# Patient Record
Sex: MALE | Race: WHITE | NOT HISPANIC OR LATINO | Employment: FULL TIME | URBAN - METROPOLITAN AREA
[De-identification: names, ages, dates, MRNs, and addresses within clinical notes are randomized per-mention and may not be internally consistent; named-entity substitution may affect disease eponyms.]

---

## 2020-02-11 ENCOUNTER — RECORDS - HEALTHEAST (OUTPATIENT)
Dept: LAB | Facility: CLINIC | Age: 65
End: 2020-02-11

## 2020-02-11 LAB
ALBUMIN SERPL-MCNC: 4 G/DL (ref 3.5–5)
ALP SERPL-CCNC: 76 U/L (ref 45–120)
ALT SERPL W P-5'-P-CCNC: 28 U/L (ref 0–45)
ANION GAP SERPL CALCULATED.3IONS-SCNC: 10 MMOL/L (ref 5–18)
AST SERPL W P-5'-P-CCNC: 34 U/L (ref 0–40)
BILIRUB SERPL-MCNC: 1.2 MG/DL (ref 0–1)
BUN SERPL-MCNC: 17 MG/DL (ref 8–22)
CALCIUM SERPL-MCNC: 8.9 MG/DL (ref 8.5–10.5)
CHLORIDE BLD-SCNC: 100 MMOL/L (ref 98–107)
CHOLEST SERPL-MCNC: 160 MG/DL
CO2 SERPL-SCNC: 27 MMOL/L (ref 22–31)
CREAT SERPL-MCNC: 1.04 MG/DL (ref 0.7–1.3)
FASTING STATUS PATIENT QL REPORTED: ABNORMAL
GFR SERPL CREATININE-BSD FRML MDRD: >60 ML/MIN/1.73M2
GLUCOSE BLD-MCNC: 105 MG/DL (ref 70–125)
HDLC SERPL-MCNC: 39 MG/DL
LDLC SERPL CALC-MCNC: 102 MG/DL
POTASSIUM BLD-SCNC: 4 MMOL/L (ref 3.5–5)
PROT SERPL-MCNC: 7 G/DL (ref 6–8)
PSA SERPL-MCNC: 1.4 NG/ML (ref 0–4.5)
SODIUM SERPL-SCNC: 137 MMOL/L (ref 136–145)
TRIGL SERPL-MCNC: 94 MG/DL

## 2020-03-18 ENCOUNTER — RECORDS - HEALTHEAST (OUTPATIENT)
Dept: LAB | Facility: CLINIC | Age: 65
End: 2020-03-18

## 2020-03-18 LAB
ALBUMIN SERPL-MCNC: 4 G/DL (ref 3.5–5)
ANION GAP SERPL CALCULATED.3IONS-SCNC: 13 MMOL/L (ref 5–18)
BUN SERPL-MCNC: 25 MG/DL (ref 8–22)
CALCIUM SERPL-MCNC: 9.5 MG/DL (ref 8.5–10.5)
CHLORIDE BLD-SCNC: 104 MMOL/L (ref 98–107)
CO2 SERPL-SCNC: 24 MMOL/L (ref 22–31)
CREAT SERPL-MCNC: 0.91 MG/DL (ref 0.7–1.3)
GFR SERPL CREATININE-BSD FRML MDRD: >60 ML/MIN/1.73M2
GLUCOSE BLD-MCNC: 66 MG/DL (ref 70–125)
PHOSPHATE SERPL-MCNC: 3.5 MG/DL (ref 2.5–4.5)
POTASSIUM BLD-SCNC: 5.2 MMOL/L (ref 3.5–5)
SODIUM SERPL-SCNC: 141 MMOL/L (ref 136–145)

## 2020-03-20 ASSESSMENT — MIFFLIN-ST. JEOR: SCORE: 1700.51

## 2020-03-23 ENCOUNTER — ANESTHESIA - HEALTHEAST (OUTPATIENT)
Dept: SURGERY | Facility: HOSPITAL | Age: 65
End: 2020-03-23

## 2020-03-24 ENCOUNTER — SURGERY - HEALTHEAST (OUTPATIENT)
Dept: SURGERY | Facility: HOSPITAL | Age: 65
End: 2020-03-24

## 2020-05-26 ENCOUNTER — RECORDS - HEALTHEAST (OUTPATIENT)
Dept: LAB | Facility: CLINIC | Age: 65
End: 2020-05-26

## 2020-05-26 LAB — POTASSIUM BLD-SCNC: 4.2 MMOL/L (ref 3.5–5)

## 2021-06-04 VITALS — WEIGHT: 207 LBS | HEIGHT: 68 IN | BODY MASS INDEX: 31.37 KG/M2

## 2021-06-07 NOTE — ANESTHESIA POSTPROCEDURE EVALUATION
Patient: Moo Orourke  Procedure(s):  CIRCUMCISION  Anesthesia type: general    Patient location: Phase II Recovery  Last vitals:   Vitals Value Taken Time   /76 3/24/2020  3:00 PM   Temp 36.6  C (97.8  F) 3/24/2020  2:14 PM   Pulse 96 3/24/2020  3:00 PM   Resp 16 3/24/2020  3:00 PM   SpO2 98 % 3/24/2020  3:00 PM     Post vital signs: stable  Level of consciousness: awake and responds to simple questions  Post-anesthesia pain: pain controlled  Post-anesthesia nausea and vomiting: no  Pulmonary: unassisted, return to baseline  Cardiovascular: stable and blood pressure at baseline  Hydration: adequate  Anesthetic events: no    QCDR Measures:  ASA# 11 - Anita-op Cardiac Arrest: ASA11B - Patient did NOT experience unanticipated cardiac arrest  ASA# 12 - Anita-op Mortality Rate: ASA12B - Patient did NOT die  ASA# 13 - PACU Re-Intubation Rate: ASA13B - Patient did NOT require a new airway mgmt  ASA# 10 - Composite Anes Safety: ASA10A - No serious adverse event    Additional Notes:

## 2021-06-07 NOTE — ANESTHESIA CARE TRANSFER NOTE
Last vitals:   Vitals:    03/24/20 1414   BP: 163/76   Pulse: (!) 103   Resp: 18   Temp: 36.6  C (97.8  F)   SpO2: 100%     Patient's level of consciousness is drowsy  Spontaneous respirations: yes  Maintains airway independently: yes  Dentition unchanged: yes  Oropharynx: oropharynx clear of all foreign objects    QCDR Measures:  ASA# 20 - Surgical Safety Checklist: WHO surgical safety checklist completed prior to induction    PQRS# 430 - Adult PONV Prevention: 4558F - Pt received => 2 anti-emetic agents (different classes) preop & intraop  ASA# 8 - Peds PONV Prevention: NA - Not pediatric patient, not GA or 2 or more risk factors NOT present  PQRS# 424 - Anita-op Temp Management: 4559F-8P - Body temp not recorded within timeframe  PQRS# 426 - PACU Transfer Protocol: - Transfer of care checklist used  ASA# 14 - Acute Post-op Pain: ASA14B - Patient did NOT experience pain >= 7 out of 10

## 2021-07-07 NOTE — ANESTHESIA PREPROCEDURE EVALUATION
Anesthesia Evaluation      Patient summary reviewed   No history of anesthetic complications     Airway   Mallampati: II  Neck ROM: full   Pulmonary - negative ROS and normal exam     ROS comment: Chronic cough since 11/2019                           Cardiovascular - normal exam  (+) hypertension well controlled, ,      Neuro/Psych - negative ROS     Endo/Other       Comments: Psoriasis      GI/Hepatic/Renal - negative ROS      Other findings: Basal cell cancer s/p mohs on scalp        Dental - normal exam   (+) caps                       Anesthesia Plan  Planned anesthetic: general endotracheal  Versed/fentanyl/propofol/sux  Ketamine PRN  Decadron/zofran    ASA 2   Induction: intravenous   Anesthetic plan and risks discussed with: patient  Anesthesia plan special considerations: antiemetics,   Post-op plan: routine recovery      Labs reviewed.  Imaging reviewed.     Routine observation Routine observation

## 2022-02-16 ENCOUNTER — LAB REQUISITION (OUTPATIENT)
Dept: LAB | Facility: CLINIC | Age: 67
End: 2022-02-16
Payer: COMMERCIAL

## 2022-02-16 DIAGNOSIS — I10 ESSENTIAL (PRIMARY) HYPERTENSION: ICD-10-CM

## 2022-02-16 DIAGNOSIS — Z12.5 ENCOUNTER FOR SCREENING FOR MALIGNANT NEOPLASM OF PROSTATE: ICD-10-CM

## 2022-02-16 DIAGNOSIS — Z13.220 ENCOUNTER FOR SCREENING FOR LIPOID DISORDERS: ICD-10-CM

## 2022-02-16 PROCEDURE — G0103 PSA SCREENING: HCPCS | Mod: ORL | Performed by: FAMILY MEDICINE

## 2022-02-16 PROCEDURE — 80061 LIPID PANEL: CPT | Mod: ORL | Performed by: FAMILY MEDICINE

## 2022-02-16 PROCEDURE — 80053 COMPREHEN METABOLIC PANEL: CPT | Mod: ORL | Performed by: FAMILY MEDICINE

## 2022-02-17 LAB
ALBUMIN SERPL-MCNC: 4.3 G/DL (ref 3.5–5)
ALP SERPL-CCNC: 72 U/L (ref 45–120)
ALT SERPL W P-5'-P-CCNC: 43 U/L (ref 0–45)
ANION GAP SERPL CALCULATED.3IONS-SCNC: 11 MMOL/L (ref 5–18)
AST SERPL W P-5'-P-CCNC: 36 U/L (ref 0–40)
BILIRUB SERPL-MCNC: 1.1 MG/DL (ref 0–1)
BUN SERPL-MCNC: 27 MG/DL (ref 8–22)
CALCIUM SERPL-MCNC: 9.9 MG/DL (ref 8.5–10.5)
CHLORIDE BLD-SCNC: 104 MMOL/L (ref 98–107)
CHOLEST SERPL-MCNC: 211 MG/DL
CO2 SERPL-SCNC: 25 MMOL/L (ref 22–31)
CREAT SERPL-MCNC: 0.94 MG/DL (ref 0.7–1.3)
GFR SERPL CREATININE-BSD FRML MDRD: >90 ML/MIN/1.73M2
GLUCOSE BLD-MCNC: 93 MG/DL (ref 70–125)
HDLC SERPL-MCNC: 41 MG/DL
LDLC SERPL CALC-MCNC: 125 MG/DL
POTASSIUM BLD-SCNC: 4 MMOL/L (ref 3.5–5)
PROT SERPL-MCNC: 7.7 G/DL (ref 6–8)
PSA SERPL-MCNC: 1.58 UG/L (ref 0–4.5)
SODIUM SERPL-SCNC: 140 MMOL/L (ref 136–145)
TRIGL SERPL-MCNC: 223 MG/DL

## 2023-02-17 ENCOUNTER — LAB REQUISITION (OUTPATIENT)
Dept: LAB | Facility: CLINIC | Age: 68
End: 2023-02-17
Payer: COMMERCIAL

## 2023-02-17 DIAGNOSIS — Z12.5 ENCOUNTER FOR SCREENING FOR MALIGNANT NEOPLASM OF PROSTATE: ICD-10-CM

## 2023-02-17 DIAGNOSIS — I10 ESSENTIAL (PRIMARY) HYPERTENSION: ICD-10-CM

## 2023-02-17 DIAGNOSIS — Z13.220 ENCOUNTER FOR SCREENING FOR LIPOID DISORDERS: ICD-10-CM

## 2023-02-17 LAB
ALBUMIN SERPL BCG-MCNC: 4.5 G/DL (ref 3.5–5.2)
ALP SERPL-CCNC: 75 U/L (ref 40–129)
ALT SERPL W P-5'-P-CCNC: 35 U/L (ref 10–50)
ANION GAP SERPL CALCULATED.3IONS-SCNC: 12 MMOL/L (ref 7–15)
AST SERPL W P-5'-P-CCNC: 39 U/L (ref 10–50)
BILIRUB SERPL-MCNC: 0.8 MG/DL
BUN SERPL-MCNC: 30.9 MG/DL (ref 8–23)
CALCIUM SERPL-MCNC: 9.1 MG/DL (ref 8.8–10.2)
CHLORIDE SERPL-SCNC: 104 MMOL/L (ref 98–107)
CHOLEST SERPL-MCNC: 215 MG/DL
CREAT SERPL-MCNC: 1.24 MG/DL (ref 0.67–1.17)
DEPRECATED HCO3 PLAS-SCNC: 24 MMOL/L (ref 22–29)
GFR SERPL CREATININE-BSD FRML MDRD: 65 ML/MIN/1.73M2
GLUCOSE SERPL-MCNC: 105 MG/DL (ref 70–99)
HDLC SERPL-MCNC: 40 MG/DL
LDLC SERPL CALC-MCNC: 134 MG/DL
NONHDLC SERPL-MCNC: 175 MG/DL
POTASSIUM SERPL-SCNC: 4.7 MMOL/L (ref 3.4–5.3)
PROT SERPL-MCNC: 6.8 G/DL (ref 6.4–8.3)
PSA SERPL-MCNC: 1.67 NG/ML (ref 0–4.5)
SODIUM SERPL-SCNC: 140 MMOL/L (ref 136–145)
TRIGL SERPL-MCNC: 206 MG/DL

## 2023-02-17 PROCEDURE — 80061 LIPID PANEL: CPT | Mod: ORL | Performed by: FAMILY MEDICINE

## 2023-02-17 PROCEDURE — G0103 PSA SCREENING: HCPCS | Mod: ORL | Performed by: FAMILY MEDICINE

## 2023-02-17 PROCEDURE — 80053 COMPREHEN METABOLIC PANEL: CPT | Mod: ORL | Performed by: FAMILY MEDICINE

## 2023-12-29 ENCOUNTER — LAB REQUISITION (OUTPATIENT)
Dept: LAB | Facility: CLINIC | Age: 68
End: 2023-12-29
Payer: COMMERCIAL

## 2023-12-29 DIAGNOSIS — I10 ESSENTIAL (PRIMARY) HYPERTENSION: ICD-10-CM

## 2023-12-29 DIAGNOSIS — H35.62 RETINAL HEMORRHAGE, LEFT EYE: ICD-10-CM

## 2023-12-29 DIAGNOSIS — Z12.5 ENCOUNTER FOR SCREENING FOR MALIGNANT NEOPLASM OF PROSTATE: ICD-10-CM

## 2023-12-29 DIAGNOSIS — Z13.220 ENCOUNTER FOR SCREENING FOR LIPOID DISORDERS: ICD-10-CM

## 2023-12-29 LAB
ALBUMIN SERPL BCG-MCNC: 4.5 G/DL (ref 3.5–5.2)
ALP SERPL-CCNC: 83 U/L (ref 40–150)
ALT SERPL W P-5'-P-CCNC: 40 U/L (ref 0–70)
ANION GAP SERPL CALCULATED.3IONS-SCNC: 15 MMOL/L (ref 7–15)
AST SERPL W P-5'-P-CCNC: 47 U/L (ref 0–45)
BILIRUB SERPL-MCNC: 1.3 MG/DL
BUN SERPL-MCNC: 19.4 MG/DL (ref 8–23)
CALCIUM SERPL-MCNC: 9.2 MG/DL (ref 8.8–10.2)
CHLORIDE SERPL-SCNC: 100 MMOL/L (ref 98–107)
CHOLEST SERPL-MCNC: 222 MG/DL
CREAT SERPL-MCNC: 1 MG/DL (ref 0.67–1.17)
CRP SERPL-MCNC: <3 MG/L
DEPRECATED HCO3 PLAS-SCNC: 23 MMOL/L (ref 22–29)
EGFRCR SERPLBLD CKD-EPI 2021: 83 ML/MIN/1.73M2
ERYTHROCYTE [DISTWIDTH] IN BLOOD BY AUTOMATED COUNT: 12.9 % (ref 10–15)
ERYTHROCYTE [SEDIMENTATION RATE] IN BLOOD BY WESTERGREN METHOD: 11 MM/HR (ref 0–20)
FASTING STATUS PATIENT QL REPORTED: ABNORMAL
GLUCOSE SERPL-MCNC: 94 MG/DL (ref 70–99)
HBA1C MFR BLD: 6.1 %
HCT VFR BLD AUTO: 45.7 % (ref 40–53)
HDLC SERPL-MCNC: 38 MG/DL
HGB BLD-MCNC: 15.6 G/DL (ref 13.3–17.7)
LDLC SERPL CALC-MCNC: 154 MG/DL
MCH RBC QN AUTO: 30.2 PG (ref 26.5–33)
MCHC RBC AUTO-ENTMCNC: 34.1 G/DL (ref 31.5–36.5)
MCV RBC AUTO: 89 FL (ref 78–100)
NONHDLC SERPL-MCNC: 184 MG/DL
PLATELET # BLD AUTO: 200 10E3/UL (ref 150–450)
POTASSIUM SERPL-SCNC: 4.1 MMOL/L (ref 3.4–5.3)
PROT SERPL-MCNC: 7.7 G/DL (ref 6.4–8.3)
PSA SERPL DL<=0.01 NG/ML-MCNC: 2.28 NG/ML (ref 0–4.5)
RBC # BLD AUTO: 5.16 10E6/UL (ref 4.4–5.9)
SODIUM SERPL-SCNC: 138 MMOL/L (ref 135–145)
TRIGL SERPL-MCNC: 152 MG/DL
WBC # BLD AUTO: 6.9 10E3/UL (ref 4–11)

## 2023-12-29 PROCEDURE — 83036 HEMOGLOBIN GLYCOSYLATED A1C: CPT | Mod: ORL | Performed by: FAMILY MEDICINE

## 2023-12-29 PROCEDURE — 80053 COMPREHEN METABOLIC PANEL: CPT | Mod: ORL | Performed by: FAMILY MEDICINE

## 2023-12-29 PROCEDURE — 80061 LIPID PANEL: CPT | Mod: ORL | Performed by: FAMILY MEDICINE

## 2023-12-29 PROCEDURE — 86140 C-REACTIVE PROTEIN: CPT | Mod: ORL | Performed by: FAMILY MEDICINE

## 2023-12-29 PROCEDURE — 85027 COMPLETE CBC AUTOMATED: CPT | Mod: ORL | Performed by: FAMILY MEDICINE

## 2023-12-29 PROCEDURE — 85652 RBC SED RATE AUTOMATED: CPT | Mod: ORL | Performed by: FAMILY MEDICINE

## 2023-12-29 PROCEDURE — G0103 PSA SCREENING: HCPCS | Mod: ORL | Performed by: FAMILY MEDICINE

## 2024-07-10 ENCOUNTER — TRANSFERRED RECORDS (OUTPATIENT)
Dept: HEALTH INFORMATION MANAGEMENT | Facility: CLINIC | Age: 69
End: 2024-07-10

## 2024-07-10 ENCOUNTER — LAB REQUISITION (OUTPATIENT)
Dept: LAB | Facility: CLINIC | Age: 69
End: 2024-07-10
Payer: COMMERCIAL

## 2024-07-10 DIAGNOSIS — I10 ESSENTIAL (PRIMARY) HYPERTENSION: ICD-10-CM

## 2024-07-10 PROCEDURE — 80048 BASIC METABOLIC PNL TOTAL CA: CPT | Mod: ORL | Performed by: PHYSICIAN ASSISTANT

## 2024-07-11 LAB
ANION GAP SERPL CALCULATED.3IONS-SCNC: 14 MMOL/L (ref 7–15)
BUN SERPL-MCNC: 26.6 MG/DL (ref 8–23)
CALCIUM SERPL-MCNC: 9.1 MG/DL (ref 8.8–10.2)
CHLORIDE SERPL-SCNC: 102 MMOL/L (ref 98–107)
CREAT SERPL-MCNC: 1.13 MG/DL (ref 0.67–1.17)
DEPRECATED HCO3 PLAS-SCNC: 23 MMOL/L (ref 22–29)
EGFRCR SERPLBLD CKD-EPI 2021: 71 ML/MIN/1.73M2
GLUCOSE SERPL-MCNC: 117 MG/DL (ref 70–99)
POTASSIUM SERPL-SCNC: 4.3 MMOL/L (ref 3.4–5.3)
SODIUM SERPL-SCNC: 139 MMOL/L (ref 135–145)

## 2024-08-13 ENCOUNTER — HOSPITAL ENCOUNTER (INPATIENT)
Facility: HOSPITAL | Age: 69
LOS: 2 days | Discharge: HOME | DRG: 321 | End: 2024-08-15
Attending: STUDENT IN AN ORGANIZED HEALTH CARE EDUCATION/TRAINING PROGRAM | Admitting: INTERNAL MEDICINE
Payer: COMMERCIAL

## 2024-08-13 ENCOUNTER — HOSPITAL ENCOUNTER (EMERGENCY)
Facility: CLINIC | Age: 69
Discharge: OTHER INSTITUTION | End: 2024-08-13
Attending: STUDENT IN AN ORGANIZED HEALTH CARE EDUCATION/TRAINING PROGRAM | Admitting: STUDENT IN AN ORGANIZED HEALTH CARE EDUCATION/TRAINING PROGRAM
Payer: COMMERCIAL

## 2024-08-13 ENCOUNTER — APPOINTMENT (OUTPATIENT)
Dept: CARDIOLOGY | Facility: HOSPITAL | Age: 69
DRG: 321 | End: 2024-08-13
Attending: INTERNAL MEDICINE
Payer: COMMERCIAL

## 2024-08-13 ENCOUNTER — APPOINTMENT (OUTPATIENT)
Dept: GENERAL RADIOLOGY | Facility: CLINIC | Age: 69
End: 2024-08-13
Attending: STUDENT IN AN ORGANIZED HEALTH CARE EDUCATION/TRAINING PROGRAM
Payer: COMMERCIAL

## 2024-08-13 VITALS
TEMPERATURE: 97.7 F | DIASTOLIC BLOOD PRESSURE: 106 MMHG | RESPIRATION RATE: 25 BRPM | WEIGHT: 216 LBS | HEART RATE: 105 BPM | OXYGEN SATURATION: 94 % | BODY MASS INDEX: 33.33 KG/M2 | SYSTOLIC BLOOD PRESSURE: 172 MMHG

## 2024-08-13 DIAGNOSIS — I10 PRIMARY HYPERTENSION: ICD-10-CM

## 2024-08-13 DIAGNOSIS — E78.5 HYPERLIPIDEMIA LDL GOAL <100: ICD-10-CM

## 2024-08-13 DIAGNOSIS — I25.10 CAD, MULTIPLE VESSEL: ICD-10-CM

## 2024-08-13 DIAGNOSIS — I21.3 ST ELEVATION MI (STEMI) (H): ICD-10-CM

## 2024-08-13 DIAGNOSIS — I21.11 ST ELEVATION MYOCARDIAL INFARCTION INVOLVING RIGHT CORONARY ARTERY (H): Primary | ICD-10-CM

## 2024-08-13 DIAGNOSIS — I25.2 HISTORY OF ST ELEVATION MYOCARDIAL INFARCTION (STEMI): ICD-10-CM

## 2024-08-13 DIAGNOSIS — Z95.5 STATUS POST INSERTION OF DRUG-ELUTING STENT INTO LEFT ANTERIOR DESCENDING (LAD) ARTERY: ICD-10-CM

## 2024-08-13 DIAGNOSIS — I21.3 ST ELEVATION MYOCARDIAL INFARCTION (STEMI), UNSPECIFIED ARTERY (H): ICD-10-CM

## 2024-08-13 LAB
ACT BLD: 270 SECONDS (ref 74–150)
ACT BLD: 354 SECONDS (ref 74–150)
ACT BLD: 408 SECONDS (ref 74–150)
ANION GAP SERPL CALCULATED.3IONS-SCNC: 14 MMOL/L (ref 7–15)
APTT PPP: 30 SECONDS (ref 22–38)
ATRIAL RATE - MUSE: 77 BPM
BASOPHILS # BLD AUTO: 0 10E3/UL (ref 0–0.2)
BASOPHILS NFR BLD AUTO: 0 %
BUN SERPL-MCNC: 30.5 MG/DL (ref 8–23)
CALCIUM SERPL-MCNC: 9.3 MG/DL (ref 8.8–10.4)
CHLORIDE SERPL-SCNC: 100 MMOL/L (ref 98–107)
CHOLEST SERPL-MCNC: 171 MG/DL
CREAT SERPL-MCNC: 1.02 MG/DL (ref 0.67–1.17)
DIASTOLIC BLOOD PRESSURE - MUSE: NORMAL MMHG
EGFRCR SERPLBLD CKD-EPI 2021: 81 ML/MIN/1.73M2
EOSINOPHIL # BLD AUTO: 0.1 10E3/UL (ref 0–0.7)
EOSINOPHIL NFR BLD AUTO: 1 %
ERYTHROCYTE [DISTWIDTH] IN BLOOD BY AUTOMATED COUNT: 12.9 % (ref 10–15)
GLUCOSE SERPL-MCNC: 145 MG/DL (ref 70–99)
HCO3 SERPL-SCNC: 25 MMOL/L (ref 22–29)
HCT VFR BLD AUTO: 46.3 % (ref 40–53)
HDLC SERPL-MCNC: 37 MG/DL
HGB BLD-MCNC: 15.9 G/DL (ref 13.3–17.7)
HOLD SPECIMEN: NORMAL
IMM GRANULOCYTES # BLD: 0 10E3/UL
IMM GRANULOCYTES NFR BLD: 0 %
INR PPP: 1.11 (ref 0.85–1.15)
INTERPRETATION ECG - MUSE: NORMAL
LDLC SERPL CALC-MCNC: 124 MG/DL
LVEF ECHO: NORMAL
LYMPHOCYTES # BLD AUTO: 2 10E3/UL (ref 0.8–5.3)
LYMPHOCYTES NFR BLD AUTO: 26 %
MCH RBC QN AUTO: 30.6 PG (ref 26.5–33)
MCHC RBC AUTO-ENTMCNC: 34.3 G/DL (ref 31.5–36.5)
MCV RBC AUTO: 89 FL (ref 78–100)
MONOCYTES # BLD AUTO: 0.5 10E3/UL (ref 0–1.3)
MONOCYTES NFR BLD AUTO: 7 %
NEUTROPHILS # BLD AUTO: 4.9 10E3/UL (ref 1.6–8.3)
NEUTROPHILS NFR BLD AUTO: 65 %
NONHDLC SERPL-MCNC: 134 MG/DL
NRBC # BLD AUTO: 0 10E3/UL
NRBC BLD AUTO-RTO: 0 /100
P AXIS - MUSE: 58 DEGREES
PLATELET # BLD AUTO: 209 10E3/UL (ref 150–450)
POTASSIUM SERPL-SCNC: 3.7 MMOL/L (ref 3.4–5.3)
PR INTERVAL - MUSE: 152 MS
QRS DURATION - MUSE: 78 MS
QT - MUSE: 368 MS
QTC - MUSE: 416 MS
R AXIS - MUSE: -16 DEGREES
RBC # BLD AUTO: 5.2 10E6/UL (ref 4.4–5.9)
SODIUM SERPL-SCNC: 139 MMOL/L (ref 135–145)
SYSTOLIC BLOOD PRESSURE - MUSE: NORMAL MMHG
T AXIS - MUSE: 8 DEGREES
TRIGL SERPL-MCNC: 49 MG/DL
TROPONIN T SERPL HS-MCNC: 128 NG/L
VENTRICULAR RATE- MUSE: 77 BPM
WBC # BLD AUTO: 7.6 10E3/UL (ref 4–11)

## 2024-08-13 PROCEDURE — 258N000003 HC RX IP 258 OP 636: Performed by: INTERNAL MEDICINE

## 2024-08-13 PROCEDURE — 250N000009 HC RX 250: Performed by: INTERNAL MEDICINE

## 2024-08-13 PROCEDURE — C1887 CATHETER, GUIDING: HCPCS | Performed by: INTERNAL MEDICINE

## 2024-08-13 PROCEDURE — 250N000013 HC RX MED GY IP 250 OP 250 PS 637: Performed by: STUDENT IN AN ORGANIZED HEALTH CARE EDUCATION/TRAINING PROGRAM

## 2024-08-13 PROCEDURE — 250N000011 HC RX IP 250 OP 636: Performed by: STUDENT IN AN ORGANIZED HEALTH CARE EDUCATION/TRAINING PROGRAM

## 2024-08-13 PROCEDURE — B240ZZ3 ULTRASONOGRAPHY OF SINGLE CORONARY ARTERY, INTRAVASCULAR: ICD-10-PCS | Performed by: INTERNAL MEDICINE

## 2024-08-13 PROCEDURE — 93010 ELECTROCARDIOGRAM REPORT: CPT | Performed by: STUDENT IN AN ORGANIZED HEALTH CARE EDUCATION/TRAINING PROGRAM

## 2024-08-13 PROCEDURE — 999N000208 ECHOCARDIOGRAM COMPLETE

## 2024-08-13 PROCEDURE — 99223 1ST HOSP IP/OBS HIGH 75: CPT | Performed by: STUDENT IN AN ORGANIZED HEALTH CARE EDUCATION/TRAINING PROGRAM

## 2024-08-13 PROCEDURE — 250N000013 HC RX MED GY IP 250 OP 250 PS 637: Performed by: INTERNAL MEDICINE

## 2024-08-13 PROCEDURE — 85347 COAGULATION TIME ACTIVATED: CPT

## 2024-08-13 PROCEDURE — 99153 MOD SED SAME PHYS/QHP EA: CPT | Performed by: INTERNAL MEDICINE

## 2024-08-13 PROCEDURE — 93306 TTE W/DOPPLER COMPLETE: CPT | Mod: 26 | Performed by: INTERNAL MEDICINE

## 2024-08-13 PROCEDURE — 255N000002 HC RX 255 OP 636: Performed by: INTERNAL MEDICINE

## 2024-08-13 PROCEDURE — 272N000001 HC OR GENERAL SUPPLY STERILE: Performed by: INTERNAL MEDICINE

## 2024-08-13 PROCEDURE — C9606 PERC D-E COR REVASC W AMI S: HCPCS | Mod: LD | Performed by: INTERNAL MEDICINE

## 2024-08-13 PROCEDURE — 027034Z DILATION OF CORONARY ARTERY, ONE ARTERY WITH DRUG-ELUTING INTRALUMINAL DEVICE, PERCUTANEOUS APPROACH: ICD-10-PCS | Performed by: INTERNAL MEDICINE

## 2024-08-13 PROCEDURE — 36415 COLL VENOUS BLD VENIPUNCTURE: CPT | Performed by: STUDENT IN AN ORGANIZED HEALTH CARE EDUCATION/TRAINING PROGRAM

## 2024-08-13 PROCEDURE — C1725 CATH, TRANSLUMIN NON-LASER: HCPCS | Performed by: INTERNAL MEDICINE

## 2024-08-13 PROCEDURE — 99152 MOD SED SAME PHYS/QHP 5/>YRS: CPT | Performed by: INTERNAL MEDICINE

## 2024-08-13 PROCEDURE — C1769 GUIDE WIRE: HCPCS | Performed by: INTERNAL MEDICINE

## 2024-08-13 PROCEDURE — 36415 COLL VENOUS BLD VENIPUNCTURE: CPT | Performed by: INTERNAL MEDICINE

## 2024-08-13 PROCEDURE — 93005 ELECTROCARDIOGRAM TRACING: CPT | Performed by: INTERNAL MEDICINE

## 2024-08-13 PROCEDURE — 85730 THROMBOPLASTIN TIME PARTIAL: CPT | Performed by: STUDENT IN AN ORGANIZED HEALTH CARE EDUCATION/TRAINING PROGRAM

## 2024-08-13 PROCEDURE — 93005 ELECTROCARDIOGRAM TRACING: CPT | Performed by: STUDENT IN AN ORGANIZED HEALTH CARE EDUCATION/TRAINING PROGRAM

## 2024-08-13 PROCEDURE — 99223 1ST HOSP IP/OBS HIGH 75: CPT | Performed by: INTERNAL MEDICINE

## 2024-08-13 PROCEDURE — 83718 ASSAY OF LIPOPROTEIN: CPT | Performed by: INTERNAL MEDICINE

## 2024-08-13 PROCEDURE — 85048 AUTOMATED LEUKOCYTE COUNT: CPT | Performed by: STUDENT IN AN ORGANIZED HEALTH CARE EDUCATION/TRAINING PROGRAM

## 2024-08-13 PROCEDURE — C1874 STENT, COATED/COV W/DEL SYS: HCPCS | Performed by: INTERNAL MEDICINE

## 2024-08-13 PROCEDURE — B2111ZZ FLUOROSCOPY OF MULTIPLE CORONARY ARTERIES USING LOW OSMOLAR CONTRAST: ICD-10-PCS | Performed by: INTERNAL MEDICINE

## 2024-08-13 PROCEDURE — 80048 BASIC METABOLIC PNL TOTAL CA: CPT | Performed by: STUDENT IN AN ORGANIZED HEALTH CARE EDUCATION/TRAINING PROGRAM

## 2024-08-13 PROCEDURE — 99285 EMERGENCY DEPT VISIT HI MDM: CPT

## 2024-08-13 PROCEDURE — 120N000004 HC R&B MS OVERFLOW

## 2024-08-13 PROCEDURE — 84484 ASSAY OF TROPONIN QUANT: CPT | Performed by: STUDENT IN AN ORGANIZED HEALTH CARE EDUCATION/TRAINING PROGRAM

## 2024-08-13 PROCEDURE — 93005 ELECTROCARDIOGRAM TRACING: CPT

## 2024-08-13 PROCEDURE — 71045 X-RAY EXAM CHEST 1 VIEW: CPT

## 2024-08-13 PROCEDURE — 99285 EMERGENCY DEPT VISIT HI MDM: CPT | Mod: 25 | Performed by: STUDENT IN AN ORGANIZED HEALTH CARE EDUCATION/TRAINING PROGRAM

## 2024-08-13 PROCEDURE — 250N000011 HC RX IP 250 OP 636: Performed by: INTERNAL MEDICINE

## 2024-08-13 PROCEDURE — 93010 ELECTROCARDIOGRAM REPORT: CPT | Mod: 77 | Performed by: STUDENT IN AN ORGANIZED HEALTH CARE EDUCATION/TRAINING PROGRAM

## 2024-08-13 PROCEDURE — C1894 INTRO/SHEATH, NON-LASER: HCPCS | Performed by: INTERNAL MEDICINE

## 2024-08-13 PROCEDURE — 93454 CORONARY ARTERY ANGIO S&I: CPT | Performed by: INTERNAL MEDICINE

## 2024-08-13 PROCEDURE — C1753 CATH, INTRAVAS ULTRASOUND: HCPCS | Performed by: INTERNAL MEDICINE

## 2024-08-13 PROCEDURE — 96374 THER/PROPH/DIAG INJ IV PUSH: CPT | Performed by: STUDENT IN AN ORGANIZED HEALTH CARE EDUCATION/TRAINING PROGRAM

## 2024-08-13 PROCEDURE — 85610 PROTHROMBIN TIME: CPT | Performed by: STUDENT IN AN ORGANIZED HEALTH CARE EDUCATION/TRAINING PROGRAM

## 2024-08-13 PROCEDURE — 92978 ENDOLUMINL IVUS OCT C 1ST: CPT | Performed by: INTERNAL MEDICINE

## 2024-08-13 DEVICE — STENT CORONARY DES SYNERGY XD MR US 3.50X20MM H7493941820350: Type: IMPLANTABLE DEVICE | Site: CORONARY | Status: FUNCTIONAL

## 2024-08-13 RX ORDER — NALOXONE HYDROCHLORIDE 0.4 MG/ML
0.4 INJECTION, SOLUTION INTRAMUSCULAR; INTRAVENOUS; SUBCUTANEOUS
Status: ACTIVE | OUTPATIENT
Start: 2024-08-13 | End: 2024-08-13

## 2024-08-13 RX ORDER — HYDROCHLOROTHIAZIDE 12.5 MG/1
12.5 TABLET ORAL DAILY
Status: DISCONTINUED | OUTPATIENT
Start: 2024-08-13 | End: 2024-08-15 | Stop reason: HOSPADM

## 2024-08-13 RX ORDER — NALOXONE HYDROCHLORIDE 0.4 MG/ML
0.2 INJECTION, SOLUTION INTRAMUSCULAR; INTRAVENOUS; SUBCUTANEOUS
Status: ACTIVE | OUTPATIENT
Start: 2024-08-13 | End: 2024-08-13

## 2024-08-13 RX ORDER — ASPIRIN 81 MG/1
81 TABLET, CHEWABLE ORAL ONCE
Status: DISCONTINUED | OUTPATIENT
Start: 2024-08-13 | End: 2024-08-13

## 2024-08-13 RX ORDER — ATORVASTATIN CALCIUM 40 MG/1
40 TABLET, FILM COATED ORAL DAILY
Qty: 90 TABLET | Refills: 3 | Status: SHIPPED | OUTPATIENT
Start: 2024-08-13 | End: 2024-08-15

## 2024-08-13 RX ORDER — IRBESARTAN AND HYDROCHLOROTHIAZIDE 150; 12.5 MG/1; MG/1
1 TABLET, FILM COATED ORAL DAILY
Status: ON HOLD | COMMUNITY
End: 2024-08-15

## 2024-08-13 RX ORDER — ONDANSETRON 4 MG/1
4 TABLET, ORALLY DISINTEGRATING ORAL EVERY 6 HOURS PRN
Status: DISCONTINUED | OUTPATIENT
Start: 2024-08-13 | End: 2024-08-15 | Stop reason: HOSPADM

## 2024-08-13 RX ORDER — ASPIRIN 81 MG/1
81 TABLET ORAL DAILY
Status: DISCONTINUED | OUTPATIENT
Start: 2024-08-14 | End: 2024-08-15 | Stop reason: HOSPADM

## 2024-08-13 RX ORDER — NITROGLYCERIN 0.4 MG/1
0.4 TABLET SUBLINGUAL EVERY 5 MIN PRN
Status: DISCONTINUED | OUTPATIENT
Start: 2024-08-13 | End: 2024-08-15 | Stop reason: HOSPADM

## 2024-08-13 RX ORDER — CARVEDILOL 3.12 MG/1
6.25 TABLET ORAL 2 TIMES DAILY WITH MEALS
Status: DISCONTINUED | OUTPATIENT
Start: 2024-08-13 | End: 2024-08-15 | Stop reason: HOSPADM

## 2024-08-13 RX ORDER — CHOLECALCIFEROL (VITAMIN D3) 50 MCG
2 TABLET ORAL DAILY
COMMUNITY

## 2024-08-13 RX ORDER — NITROGLYCERIN 0.4 MG/1
0.4 TABLET SUBLINGUAL EVERY 5 MIN PRN
Status: DISCONTINUED | OUTPATIENT
Start: 2024-08-13 | End: 2024-08-13 | Stop reason: HOSPADM

## 2024-08-13 RX ORDER — ASPIRIN 81 MG/1
81 TABLET ORAL DAILY
Qty: 30 TABLET | Refills: 3 | Status: SHIPPED | OUTPATIENT
Start: 2024-08-14 | End: 2024-08-15

## 2024-08-13 RX ORDER — NIACIN 100 MG
100 TABLET ORAL
COMMUNITY

## 2024-08-13 RX ORDER — NITROGLYCERIN 5 MG/ML
VIAL (ML) INTRAVENOUS
Status: DISCONTINUED | OUTPATIENT
Start: 2024-08-13 | End: 2024-08-13 | Stop reason: HOSPADM

## 2024-08-13 RX ORDER — VERAPAMIL HYDROCHLORIDE 2.5 MG/ML
INJECTION, SOLUTION INTRAVENOUS
Status: DISCONTINUED | OUTPATIENT
Start: 2024-08-13 | End: 2024-08-13 | Stop reason: HOSPADM

## 2024-08-13 RX ORDER — FENTANYL CITRATE 50 UG/ML
INJECTION, SOLUTION INTRAMUSCULAR; INTRAVENOUS
Status: DISCONTINUED | OUTPATIENT
Start: 2024-08-13 | End: 2024-08-13 | Stop reason: HOSPADM

## 2024-08-13 RX ORDER — MULTIPLE VITAMINS W/ MINERALS TAB 9MG-400MCG
1 TAB ORAL DAILY
Status: ON HOLD | COMMUNITY
End: 2024-08-15

## 2024-08-13 RX ORDER — HYDRALAZINE HYDROCHLORIDE 20 MG/ML
10 INJECTION INTRAMUSCULAR; INTRAVENOUS EVERY 4 HOURS PRN
Status: DISCONTINUED | OUTPATIENT
Start: 2024-08-13 | End: 2024-08-15 | Stop reason: HOSPADM

## 2024-08-13 RX ORDER — ONDANSETRON 2 MG/ML
4 INJECTION INTRAMUSCULAR; INTRAVENOUS EVERY 6 HOURS PRN
Status: DISCONTINUED | OUTPATIENT
Start: 2024-08-13 | End: 2024-08-15 | Stop reason: HOSPADM

## 2024-08-13 RX ORDER — FENTANYL CITRATE 50 UG/ML
25 INJECTION, SOLUTION INTRAMUSCULAR; INTRAVENOUS
Status: DISCONTINUED | OUTPATIENT
Start: 2024-08-13 | End: 2024-08-15 | Stop reason: HOSPADM

## 2024-08-13 RX ORDER — IODIXANOL 320 MG/ML
INJECTION, SOLUTION INTRAVASCULAR
Status: DISCONTINUED | OUTPATIENT
Start: 2024-08-13 | End: 2024-08-13 | Stop reason: HOSPADM

## 2024-08-13 RX ORDER — IRBESARTAN 150 MG/1
150 TABLET ORAL DAILY
Status: DISCONTINUED | OUTPATIENT
Start: 2024-08-14 | End: 2024-08-15 | Stop reason: HOSPADM

## 2024-08-13 RX ORDER — METOPROLOL TARTRATE 25 MG/1
25 TABLET, FILM COATED ORAL 2 TIMES DAILY
Status: DISCONTINUED | OUTPATIENT
Start: 2024-08-13 | End: 2024-08-13

## 2024-08-13 RX ORDER — METOPROLOL TARTRATE 1 MG/ML
5 INJECTION, SOLUTION INTRAVENOUS
Status: DISCONTINUED | OUTPATIENT
Start: 2024-08-13 | End: 2024-08-15 | Stop reason: HOSPADM

## 2024-08-13 RX ORDER — LISINOPRIL 5 MG/1
10 TABLET ORAL DAILY
Status: DISCONTINUED | OUTPATIENT
Start: 2024-08-13 | End: 2024-08-13

## 2024-08-13 RX ORDER — SODIUM CHLORIDE 9 MG/ML
INJECTION, SOLUTION INTRAVENOUS CONTINUOUS
Status: ACTIVE | OUTPATIENT
Start: 2024-08-13 | End: 2024-08-13

## 2024-08-13 RX ORDER — ASPIRIN 81 MG/1
324 TABLET, CHEWABLE ORAL ONCE
Status: COMPLETED | OUTPATIENT
Start: 2024-08-13 | End: 2024-08-13

## 2024-08-13 RX ORDER — OXYCODONE HYDROCHLORIDE 5 MG/1
5 TABLET ORAL EVERY 4 HOURS PRN
Status: DISCONTINUED | OUTPATIENT
Start: 2024-08-13 | End: 2024-08-15 | Stop reason: HOSPADM

## 2024-08-13 RX ORDER — TURMERIC 400 MG
400 CAPSULE ORAL DAILY
COMMUNITY

## 2024-08-13 RX ORDER — ATROPINE SULFATE 0.1 MG/ML
0.5 INJECTION INTRAVENOUS
Status: ACTIVE | OUTPATIENT
Start: 2024-08-13 | End: 2024-08-13

## 2024-08-13 RX ORDER — HEPARIN SODIUM 1000 [USP'U]/ML
INJECTION, SOLUTION INTRAVENOUS; SUBCUTANEOUS
Status: DISCONTINUED | OUTPATIENT
Start: 2024-08-13 | End: 2024-08-13 | Stop reason: HOSPADM

## 2024-08-13 RX ORDER — OXYCODONE HYDROCHLORIDE 5 MG/1
10 TABLET ORAL EVERY 4 HOURS PRN
Status: DISCONTINUED | OUTPATIENT
Start: 2024-08-13 | End: 2024-08-15 | Stop reason: HOSPADM

## 2024-08-13 RX ORDER — ACETAMINOPHEN 325 MG/1
650 TABLET ORAL EVERY 4 HOURS PRN
Status: DISCONTINUED | OUTPATIENT
Start: 2024-08-13 | End: 2024-08-15 | Stop reason: HOSPADM

## 2024-08-13 RX ORDER — ATORVASTATIN CALCIUM 40 MG/1
80 TABLET, FILM COATED ORAL DAILY
Status: DISCONTINUED | OUTPATIENT
Start: 2024-08-13 | End: 2024-08-15 | Stop reason: HOSPADM

## 2024-08-13 RX ORDER — FLUMAZENIL 0.1 MG/ML
0.2 INJECTION, SOLUTION INTRAVENOUS
Status: ACTIVE | OUTPATIENT
Start: 2024-08-13 | End: 2024-08-13

## 2024-08-13 RX ADMIN — MAGNESIUM OXIDE TAB 400 MG (241.3 MG ELEMENTAL MG) 200 MG: 400 (241.3 MG) TAB at 09:54

## 2024-08-13 RX ADMIN — METOPROLOL TARTRATE 25 MG: 25 TABLET, FILM COATED ORAL at 08:19

## 2024-08-13 RX ADMIN — TICAGRELOR 180 MG: 90 TABLET ORAL at 04:31

## 2024-08-13 RX ADMIN — TICAGRELOR 90 MG: 90 TABLET ORAL at 16:08

## 2024-08-13 RX ADMIN — ATORVASTATIN CALCIUM 80 MG: 40 TABLET, FILM COATED ORAL at 08:19

## 2024-08-13 RX ADMIN — NITROGLYCERIN 0.4 MG: 0.4 TABLET, ORALLY DISINTEGRATING SUBLINGUAL at 04:36

## 2024-08-13 RX ADMIN — HEPARIN SODIUM 7000 UNITS: 1000 INJECTION INTRAVENOUS; SUBCUTANEOUS at 04:35

## 2024-08-13 RX ADMIN — SODIUM CHLORIDE: 9 INJECTION, SOLUTION INTRAVENOUS at 06:42

## 2024-08-13 RX ADMIN — CARVEDILOL 6.25 MG: 3.12 TABLET, FILM COATED ORAL at 16:08

## 2024-08-13 RX ADMIN — ASPIRIN 81 MG CHEWABLE TABLET 324 MG: 81 TABLET CHEWABLE at 04:30

## 2024-08-13 RX ADMIN — PERFLUTREN 2 ML: 6.52 INJECTION, SUSPENSION INTRAVENOUS at 07:53

## 2024-08-13 RX ADMIN — LISINOPRIL 10 MG: 5 TABLET ORAL at 08:19

## 2024-08-13 ASSESSMENT — COLUMBIA-SUICIDE SEVERITY RATING SCALE - C-SSRS
1. IN THE PAST MONTH, HAVE YOU WISHED YOU WERE DEAD OR WISHED YOU COULD GO TO SLEEP AND NOT WAKE UP?: NO
6. HAVE YOU EVER DONE ANYTHING, STARTED TO DO ANYTHING, OR PREPARED TO DO ANYTHING TO END YOUR LIFE?: NO
6. HAVE YOU EVER DONE ANYTHING, STARTED TO DO ANYTHING, OR PREPARED TO DO ANYTHING TO END YOUR LIFE?: NO
1. IN THE PAST MONTH, HAVE YOU WISHED YOU WERE DEAD OR WISHED YOU COULD GO TO SLEEP AND NOT WAKE UP?: NO
2. HAVE YOU ACTUALLY HAD ANY THOUGHTS OF KILLING YOURSELF IN THE PAST MONTH?: NO
2. HAVE YOU ACTUALLY HAD ANY THOUGHTS OF KILLING YOURSELF IN THE PAST MONTH?: NO

## 2024-08-13 ASSESSMENT — ACTIVITIES OF DAILY LIVING (ADL)
ADLS_ACUITY_SCORE: 38
ADLS_ACUITY_SCORE: 35
ADLS_ACUITY_SCORE: 38
ADLS_ACUITY_SCORE: 40
ADLS_ACUITY_SCORE: 38
ADLS_ACUITY_SCORE: 33
ADLS_ACUITY_SCORE: 35
ADLS_ACUITY_SCORE: 38
ADLS_ACUITY_SCORE: 38

## 2024-08-13 NOTE — ED PROVIDER NOTES
History     Chief Complaint   Patient presents with    Chest Pain     Pt states he has had intermittent chest pressure for the past couple of days. Associated symptoms with the chest pressure are diaphoresis, dizziness and no N/V. Pt states the pressure radiates to the right shoulder and got worse tonight so decided to get it checked out.      HPI  Moo Orourke is a 69 year old male who has hypertension who presents to the emergency department for evaluation of chest pain.  Patient states that 5 days ago he first noticed some vague pressure in the middle of his chest.  It went away on its own he did not think much of it.  However, since last night he has had increasingly worsening pressure in the middle of his chest that radiates to his right arm.  He also had some sweating.  He states that symptoms come on at rest and go away on their own.  Currently feeling a little bit better but still having some tightness in the middle of his chest.  He has never anything like this before.  Does not seem to be related to exertion.  Denies back pain.  No weakness in the extremities.  No fever or recent illness.  No trouble breathing.  No abdominal pain.  No alcohol or drugs.  He does not use tobacco.  He states has been compliant with his blood pressure medication.    Allergies:  No Known Allergies    Problem List:    There are no problems to display for this patient.       Past Medical History:    Past Medical History:   Diagnosis Date    Chronic cough     Facial basal cell cancer     Hypertension     Phimosis of penis     Psoriasis        Past Surgical History:    Past Surgical History:   Procedure Laterality Date    CIRCUMCISION N/A 3/24/2020    Procedure: CIRCUMCISION;  Surgeon: Scott Maki MD;  Location: [facility];  Service: Urology    COLONOSCOPY      MOHS MICROGRAPHIC PROCEDURE Left 03/11/2020       Family History:    No family history on file.    Social History:  Marital Status:   [2]  Social  History     Tobacco Use    Smoking status: Never    Smokeless tobacco: Never   Substance Use Topics    Alcohol use: Never    Drug use: Never        Medications:    cholecalciferol, vitamin D3, 5,000 unit Tab  coenzyme Q10 125 mg cap  fish oil-omega-3 fatty acids 300-1,000 mg capsule  HYDROcodone-acetaminophen 5-325 mg per tablet  lisinopriL (PRINIVIL,ZESTRIL) 5 MG tablet  risankizumab-rzaa (SKYRIZI SUBQ)          Review of Systems  See HPI  Physical Exam   BP: (!) 172/106  Pulse: 101  Temp: 97.7  F (36.5  C)  Resp: 19  Weight: 98 kg (216 lb)  SpO2: 95 %      Physical Exam  BP (!) 172/106   Pulse 105   Temp 97.7  F (36.5  C) (Oral)   Resp 25   Wt 98 kg (216 lb)   SpO2 94%   BMI 33.33 kg/m    General: alert, interactive, in no apparent distress  Head: atraumatic  Nose: no rhinorrhea or epistaxis  Ears: no external auditory canal discharge or bleeding.    Eyes: Sclera nonicteric. Conjunctiva noninjected. PERRL, EOMI  Mouth: no tonsillar erythema, edema, or exudate.  Moist mucous membranes  Neck: supple, moving spontaneously no midline cervical tenderness  Lungs: No increased work of breathing.  Clear to auscultation bilaterally.  CV: Tachycardic, peripheral pulses palpable and symmetric  Abdomen: soft, nt, nd, no guarding or rebound.   Extremities: Warm and well-perfused.  No edema or calf tenderness bilaterally.  Skin: no rash or diaphoresis  Neuro: CN II-XII grossly intact, strength 5/5 in UE and LEs bilaterally, sensation intact to light touch in UE and LEs bilaterally;     ED Course        Procedures              EKG Interpretation:      Interpreted by Tyler Boykin MD  Time reviewed: 5:42 AM    Symptoms at time of EKG: Chest pain  Rhythm: sinus tachycardia  Rate: 100-110  Axis: Normal  Ectopy: none  Conduction: normal  ST Segments/ T Waves: ST Segement Elevation II, III, and aVF and ST Segment Depression I, aVL, and V2  Q Waves: none  Comparison to prior: No old EKG available    Clinical Impression:  myocardial infarction  inferior            Critical Care time:  was 20 minutes for this patient excluding procedures.                       Results for orders placed or performed during the hospital encounter of 08/13/24 (from the past 24 hour(s))   CBC with platelets differential    Narrative    The following orders were created for panel order CBC with platelets differential.  Procedure                               Abnormality         Status                     ---------                               -----------         ------                     CBC with platelets and d...[[id number]]                      Final result                 Please view results for these tests on the individual orders.   CBC with platelets and differential   Result Value Ref Range    WBC Count 7.6 4.0 - 11.0 10e3/uL    RBC Count 5.20 4.40 - 5.90 10e6/uL    Hemoglobin 15.9 13.3 - 17.7 g/dL    Hematocrit 46.3 40.0 - 53.0 %    MCV 89 78 - 100 fL    MCH 30.6 26.5 - 33.0 pg    MCHC 34.3 31.5 - 36.5 g/dL    RDW 12.9 10.0 - 15.0 %    Platelet Count 209 150 - 450 10e3/uL    % Neutrophils 65 %    % Lymphocytes 26 %    % Monocytes 7 %    % Eosinophils 1 %    % Basophils 0 %    % Immature Granulocytes 0 %    NRBCs per 100 WBC 0 <1 /100    Absolute Neutrophils 4.9 1.6 - 8.3 10e3/uL    Absolute Lymphocytes 2.0 0.8 - 5.3 10e3/uL    Absolute Monocytes 0.5 0.0 - 1.3 10e3/uL    Absolute Eosinophils 0.1 0.0 - 0.7 10e3/uL    Absolute Basophils 0.0 0.0 - 0.2 10e3/uL    Absolute Immature Granulocytes 0.0 <=0.4 10e3/uL    Absolute NRBCs 0.0 10e3/uL       Medications   nitroGLYcerin (NITROSTAT) sublingual tablet 0.4 mg (0.4 mg Sublingual $Given 8/13/24 0436)   aspirin (ASA) chewable tablet 324 mg (324 mg Oral $Given 8/13/24 0430)   ticagrelor (BRILINTA) tablet 180 mg (180 mg Oral $Given 8/13/24 0431)   heparin (porcine) inj 1000 units/mL SYR (rounds in 500 unit increments) (7,000 Units Intravenous $Given 8/13/24 0435)       Assessments & Plan (with Medical  Decision Making)     I have reviewed the nursing notes.    I have reviewed the findings, diagnosis, plan and need for follow up with the patient.     Critical Care Addendum  My initial assessment, based on my review of nursing observations, review of vital signs, focused history, physical exam, review of cardiac rhythm monitor, and 12 lead ECG analysis, established a high suspicion that Moo Orourke has  STEMI , which requires immediate intervention, and therefore he is critically ill.     After the initial assessment, the care team initiated multiple lab tests and initiated medication therapy with aspirin, Brilinta, heparin bolus  to provide stabilization care. Due to the critical nature of this patient, I reassessed nursing observations, vital signs, physical exam, 12 lead ECG analysis, mental status, and respiratory status multiple times prior to his disposition.     Time also spent performing documentation, discussion with family to obtain medical information for decision making, reviewing test results, and coordination of care.     Critical care time (excluding teaching time and procedures): 20 minutes.           Medical Decision Making  Moo Orourke is a 69 year old male who has hypertension who presents to the emergency department for evaluation of chest pain.  Vital signs reviewed notable for mild tachycardia and hypertension.  A code STEMI was called shortly after the tech handed me the EKG that was obtained right after the patient arrived.  Patient was seen right after this and having mild chest pain.  He was given nitro.  STEMI activation initiated.  Patient was given 324 g of aspirin, 180 mg of Brilinta and heparin bolus.  His vitals are stable.  EMS was already in the ER for another patient and they diverted to take this patient emergently to [facility] for Cath Lab and possible PCI.  Patient was in the ER for less than a half an hour.         New Prescriptions    No medications on file        Final diagnoses:   ST elevation myocardial infarction (STEMI), unspecified artery (H)       8/13/2024   [facility] EMERGENCY DEPT       Tlyer Boykin MD  08/13/24 0593

## 2024-08-13 NOTE — Clinical Note
The first balloon was inserted into the right coronary artery.Max pressure = 6 lydia. Total duration = 15 seconds.

## 2024-08-13 NOTE — Clinical Note
Stent deployed in the middle left anterior descending. Max pressure = 12 lydia. Total duration = 18 seconds.

## 2024-08-13 NOTE — Clinical Note
The first balloon was inserted into the circumflex and marginal circumflex.Max pressure = 14 lydia. Total duration = 10 seconds.     Max pressure = 16 lydia. Total duration = 16 seconds.    Balloon reinflated a second time: Max pressure = 16 lydia. Total duration = 16 seconds.

## 2024-08-13 NOTE — CONSULTS
NUTRITION EDUCATION      REASON:  Provider order for heart healthy diet education    NUTRITION HISTORY:  Patient reports good appetite.  He smokes meat as a hobby.  Patient states that he plans to eat a variety of meats such as chicken, turkey, fish, red meat.  Patient likes vegetables.  Patient does not drink soda.      NUTRITION DIAGNOSIS:  Food- and nutrition-related knowledge deficit R/t heart disease    INTERVENTIONS:    Nutrition Prescription:  Heart Healthy:  Low fat, Low sodium    Implementation:      *  Nutrition Education (Content):   A)  Provided handout Heart Healthy Nutrition Therapy, Label reading tips, Eat right with My Plate.   B)  Discussed cutting back on sodium, limiting to about 1 teaspoon salt for the day.  Limiting fat, cooking meat in a way that does not add fat.      *  Nutrition Education (Application):   A)  Discussed current eating habits and recommended alternative food choices      *  Anticipate good compliance      *  Diet Education - refer to Education Flowsheet    Goals:      *  Patient participated in the discussion and plans to make some changes such as eating more turkey, fish, chicken over red meat.      *  All of the above goals met during the education session    Follow Up/Monitoring:      *  Provided RD contact information for future questions      *  Recommended Out-Patient Nutrition Referral, if further diet instructions are needed

## 2024-08-13 NOTE — CARE PLAN
Patient arrived in the unit around 0630 from cath lab, post angiogram/PCI with stent placed x 1 in mid RCA. Patient alert and oriented x 4, limited movement on right leg post knee surgery in July, immobilizer/brace. Patient reported his chest pressure is gone. NS at 75 ml/hr started, TR band with 13 ml air, will start to release air at 0815. Sinus rhythm, on room air, VSS, report given to Ela PEREZ.

## 2024-08-13 NOTE — Clinical Note
Single balloon inflation. The first balloon was inserted into the circumflex and marginal circumflex.Max pressure = 8 lydia. Total duration = 20 seconds.

## 2024-08-13 NOTE — Clinical Note
The first balloon was inserted into the left anterior descending and left anterior descending diagonal.Max pressure = 6 lydia. Total duration = 14 seconds.     Max pressure = 10 lydia. Total duration = 26 seconds.    Balloon reinflated a second time: Max pressure = 10 lydia. Total duration = 26 seconds.  Balloon reinflated a third time: Max pressure = 8 lydia. Total duration = 20 seconds.

## 2024-08-13 NOTE — PHARMACY-ADMISSION MEDICATION HISTORY
Pharmacist Admission Medication History    Admission medication history is complete. The information provided in this note is only as accurate as the sources available at the time of the update.    Information Source(s): Patient via in-person    Pertinent Information: Patient cannot recall the dosage of his magnesium tablets. He injects his Skyrizi every 3 mouths. Last dosage of Skyrizi was on 7/5/24.     Changes made to PTA medication list:  Added: Irbesartan-hydrochlorothiazide, Thera-Vit M, AREDS 2, Turmeric, Magnesium, Niacin  Deleted: hydrocodone-acetaminophen, lisinopril  Changed: Vitamin D3 5000 mg to 2,000 mg    Allergies reviewed with patient and updates made in EHR: yes    Medication History Completed By: Deshaun Bronson RPH 8/13/2024 8:36 AM    PTA Med List   Medication Sig Last Dose    [START ON 8/14/2024] aspirin 81 MG EC tablet Take 1 tablet (81 mg) by mouth daily Start tomorrow.     atorvastatin (LIPITOR) 40 MG tablet Take 1 tablet (40 mg) by mouth daily     coenzyme Q10 125 mg cap Take 125 mg by mouth daily Past Month at PRN    fish oil-omega-3 fatty acids 300-1,000 mg capsule [FISH OIL-OMEGA-3 FATTY ACIDS 300-1,000 MG CAPSULE] Take 2 g by mouth daily. 8/12/2024 at AM    irbesartan-hydrochlorothiazide (AVALIDE) 150-12.5 MG tablet Take 1 tablet by mouth daily 8/12/2024 at AM    Magnesium Oxide 250 MG TABS Take 250 mg by mouth daily Past Month at PRN    Multiple Vitamins-Minerals (ICAPS AREDS 2 PO) Take 1 tablet by mouth daily 8/12/2024    multivitamin w/minerals (THERA-VIT-M) tablet Take 1 tablet by mouth daily 8/12/2024 at AM    niacin 100 MG tablet Take 100 mg by mouth daily (with breakfast) Past Month    risankizumab-rzaa (SKYRIZI SUBQ) [RISANKIZUMAB-RZAA (SKYRIZI SUBQ)] Inject under the skin. 7/5/2024    Turmeric 400 MG CAPS Take 400 mg by mouth daily Past Week    vitamin D3 (CHOLECALCIFEROL) 50 mcg (2000 units) tablet Take 2 tablets by mouth daily Past Week

## 2024-08-13 NOTE — Clinical Note
The first balloon was inserted into the left anterior descending and middle left anterior descending.Max pressure = 7 lydia. Total duration = 30 seconds.

## 2024-08-13 NOTE — Clinical Note
Single balloon inflation. The first balloon was inserted into the left anterior descending and proximal left anterior descending.Max pressure = 12 lydia. Total duration = 5 seconds.

## 2024-08-13 NOTE — Clinical Note
Single balloon inflation. The first balloon was inserted into the left anterior descending and proximal left anterior descending.Max pressure = 16 lydia. Total duration = 8 seconds.

## 2024-08-13 NOTE — Clinical Note
The first balloon was inserted into the circumflex and marginal circumflex.Max pressure = 8 lydia. Total duration = 18 seconds.     Max pressure = 8 lydia. Total duration = 15 seconds.    Balloon reinflated a second time: Max pressure = 8 lydia. Total duration = 15 seconds.

## 2024-08-13 NOTE — ED PROVIDER NOTES
Emergency Department Encounter         FINAL IMPRESSION:  STEMI          ED COURSE AND MEDICAL DECISION MAKING            69-year-old male here from [facility] as a transfer with a an inferior STEMI.  Unfortunate unable to see the EKG.  When I saw patient in room, he was alert, oriented conversive and comfortable.  Vitals are stable.  Cath Lab showed up at bedside after about 5 minutes of talking with the patient.  Otherwise hemodynamically stable.                   Medical Decision Making  Obtained supplemental history:Supplemental history obtained?: No  Reviewed external records: External records reviewed?: No  Care impacted by chronic illness:N/A  Care significantly affected by social determinants of health:N/A  Did you consider but not order tests?: Work up considered but not performed and documented in chart, if applicable  Did you interpret images independently?: Independent interpretation of ECG and images noted in documentation, when applicable.  Consultation discussion with other provider:Did you involve another provider (consultant, , pharmacy, etc.)?: No  Admit.                Critical Care     Performed by: Tyler Ordoñez or    Authorized by: Tyler Ordoñez  Total critical care time:  minutes  Critical care was necessary to treat or prevent imminent or life-threatening deterioration of the following conditions:   Critical care was time spent personally by me on the following activities: development of treatment plan with patient or surrogate, discussions with consultants, examination of patient, evaluation of patient's response to treatment, obtaining history from patient or surrogate, ordering and performing treatments and interventions, ordering and review of laboratory studies, ordering and review of radiographic studies, re-evaluation of patient's condition and monitoring for potential decompensation.  Critical care time was exclusive of separately billable procedures and treating other patients.'    At the  "conclusion of the encounter I discussed the results of all the tests and the disposition. The questions were answered. The patient or family acknowledged understanding and was agreeable with the care plan.        MEDICATIONS GIVEN IN THE EMERGENCY DEPARTMENT:  Medications - No data to display    NEW PRESCRIPTIONS STARTED AT TODAY'S ED VISIT:  New Prescriptions    No medications on file       HPI     69-year-old male here with an inferior STEMI from outside hospital.    Reports that he has been having intermittent chest pressure for the last few days.  Intermittent radiation down the right arm.        MEDICAL HISTORY     Past Medical History:   Diagnosis Date    Chronic cough     Facial basal cell cancer     Hypertension     Phimosis of penis     Psoriasis        Past Surgical History:   Procedure Laterality Date    CIRCUMCISION N/A 3/24/2020    Procedure: CIRCUMCISION;  Surgeon: Scott Maki MD;  Location: [facility];  Service: Urology    COLONOSCOPY      MOHS MICROGRAPHIC PROCEDURE Left 03/11/2020       Social History     Tobacco Use    Smoking status: Never    Smokeless tobacco: Never   Substance Use Topics    Alcohol use: Never    Drug use: Never       cholecalciferol, vitamin D3, 5,000 unit Tab  coenzyme Q10 125 mg cap  fish oil-omega-3 fatty acids 300-1,000 mg capsule  HYDROcodone-acetaminophen 5-325 mg per tablet  lisinopriL (PRINIVIL,ZESTRIL) 5 MG tablet  risankizumab-rzaa (SKYRIZI SUBQ)            PHYSICAL EXAM     BP (!) 149/89   Pulse 105   Temp 98.4  F (36.9  C) (Oral)   Resp 20   Ht 1.702 m (5' 7\")   Wt 99.8 kg (220 lb)   SpO2 97%   BMI 34.46 kg/m        PHYSICAL EXAM:     General: Patient appears well, nontoxic, comfortable  HEENT: Moist mucous membranes,  No head trauma.    Cardiovascular: Normal rate, normal rhythm, no extremity edema.  No appreciable murmur.  Respiratory: No signs of respiratory distress, lungs are clear to auscultation bilaterally with no wheezes rhonchi or " rales.  Abdominal: Soft, nontender, nondistended, no palpable masses, no guarding, no rebound  Musculoskeletal: Full range of motion of joints, no deformities appreciated.  Neurological: Alert and oriented, grossly neurologically intact.  Psychological: Normal affect and mood.  Integument: No rashes appreciated          RESULTS       Labs Ordered and Resulted from Time of ED Arrival to Time of ED Departure - No data to display    Cardiac Catheterization    (Results Pending)         PROCEDURES:  Procedures:  Procedures         Tyler Ordoñez DO  Emergency Medicine  [facility] EMERGENCY DEPARTMENT       Tyler Ordoñez DO  08/13/24 0514

## 2024-08-13 NOTE — Clinical Note
Single balloon inflation. The first balloon was inserted into the left anterior descending and left anterior descending diagonal.Max pressure = 12 lydia. Total duration = 11 seconds.     Max pressure = 12 lydia. Total duration = 11 seconds.    Balloon reinflated a second time: Max pressure = 12 lydia. Total duration = 11 seconds.

## 2024-08-13 NOTE — PLAN OF CARE
Goal Outcome Evaluation:      Plan of Care Reviewed With: patient    Overall Patient Progress: improvingOverall Patient Progress: improving    Outcome Evaluation: Pain free, no complaints, TRband site bruised;denies numbness or tingling

## 2024-08-13 NOTE — CONSULTS
[facility]    Cardiology Consultation     Date of Admission:  8/13/2024    Assessment & Plan   Moo Orourke is a 69 year old male who was admitted on 8/13/2024.    Inferior ST elevation MI status post successful PCI with single drug-eluting stent placement in the mid RCA  Residual high-grade stenosis involving the mid LAD/first diagonal and circumflex obtuse marginal branch  Hypertension    PLAN:  Admit to CICU for further care  Continue dual antiplatelet therapy for minimum of 12 months  Echocardiogram this morning  Staged PCI of the complex mid LAD/diagonal lesion during this index hospitalization  Outpatient cardiac rehab  Aggressive risk factor control      Prasanna Arriaza MD, FAC    High complexity     Prasanna Arriaza MD, MD, MD    Primary Care Physician   Manav James    Reason for Consult   Reason for consult: Acute inferior ST elevation MI    History of Present Illness   Moo Orourke is a 69 year old male with history of hypertension who presented to the emergency department this morning with complaints of chest pain.  He reports intermittent chest pressure over the past several days.  This morning pain was more severe and was associated with diaphoresis.  Electrocardiogram in the emergency department demonstrated ST elevation in the inferior pericardial leads.  The cardiac catheterization lab was subsequently activated for emergent coronary angiography with an eye towards revascularization.    Upon arrival in the Cath Lab the patient was hemodynamically stable.    Past Medical History   Past Medical History:   Diagnosis Date    Chronic cough     Facial basal cell cancer     Hypertension     Phimosis of penis     Psoriasis        Past Surgical History   Past Surgical History:   Procedure Laterality Date    CIRCUMCISION N/A 3/24/2020    Procedure: CIRCUMCISION;  Surgeon: Scott Maki MD;  Location: [facility] [place];  Service: Urology    COLONOSCOPY      MOHS  MICROGRAPHIC PROCEDURE Left 03/11/2020       Prior to Admission Medications   Prior to Admission Medications   Prescriptions Last Dose Informant Patient Reported? Taking?   HYDROcodone-acetaminophen 5-325 mg per tablet   No No   Sig: [HYDROCODONE-ACETAMINOPHEN 5-325 MG PER TABLET] Take 1 tablet by mouth every 4 (four) hours as needed for pain.   cholecalciferol, vitamin D3, 5,000 unit Tab   Yes No   Sig: [CHOLECALCIFEROL, VITAMIN D3, 5,000 UNIT TAB] Take by mouth.   coenzyme Q10 125 mg cap   Yes No   Sig: [COENZYME Q10 125 MG CAP] Take by mouth.   fish oil-omega-3 fatty acids 300-1,000 mg capsule   Yes No   Sig: [FISH OIL-OMEGA-3 FATTY ACIDS 300-1,000 MG CAPSULE] Take 2 g by mouth daily.   lisinopriL (PRINIVIL,ZESTRIL) 5 MG tablet   Yes No   Sig: [LISINOPRIL (PRINIVIL,ZESTRIL) 5 MG TABLET] Take 5 mg by mouth daily.   risankizumab-rzaa (SKYRIZI SUBQ)   Yes No   Sig: [RISANKIZUMAB-RZAA (SKYRIZI SUBQ)] Inject under the skin.      Facility-Administered Medications: None     No current facility-administered medications for this encounter.     Current Outpatient Medications   Medication Sig Dispense Refill    cholecalciferol, vitamin D3, 5,000 unit Tab [CHOLECALCIFEROL, VITAMIN D3, 5,000 UNIT TAB] Take by mouth.      coenzyme Q10 125 mg cap [COENZYME Q10 125 MG CAP] Take by mouth.      fish oil-omega-3 fatty acids 300-1,000 mg capsule [FISH OIL-OMEGA-3 FATTY ACIDS 300-1,000 MG CAPSULE] Take 2 g by mouth daily.      HYDROcodone-acetaminophen 5-325 mg per tablet [HYDROCODONE-ACETAMINOPHEN 5-325 MG PER TABLET] Take 1 tablet by mouth every 4 (four) hours as needed for pain. 12 tablet 0    lisinopriL (PRINIVIL,ZESTRIL) 5 MG tablet [LISINOPRIL (PRINIVIL,ZESTRIL) 5 MG TABLET] Take 5 mg by mouth daily.      risankizumab-rzaa (SKYRIZI SUBQ) [RISANKIZUMAB-RZAA (SKYRIZI SUBQ)] Inject under the skin.       No current facility-administered medications for this encounter.     Current Outpatient Medications   Medication Sig Dispense  "Refill    cholecalciferol, vitamin D3, 5,000 unit Tab [CHOLECALCIFEROL, VITAMIN D3, 5,000 UNIT TAB] Take by mouth.      coenzyme Q10 125 mg cap [COENZYME Q10 125 MG CAP] Take by mouth.      fish oil-omega-3 fatty acids 300-1,000 mg capsule [FISH OIL-OMEGA-3 FATTY ACIDS 300-1,000 MG CAPSULE] Take 2 g by mouth daily.      HYDROcodone-acetaminophen 5-325 mg per tablet [HYDROCODONE-ACETAMINOPHEN 5-325 MG PER TABLET] Take 1 tablet by mouth every 4 (four) hours as needed for pain. 12 tablet 0    lisinopriL (PRINIVIL,ZESTRIL) 5 MG tablet [LISINOPRIL (PRINIVIL,ZESTRIL) 5 MG TABLET] Take 5 mg by mouth daily.      risankizumab-rzaa (SKYRIZI SUBQ) [RISANKIZUMAB-RZAA (SKYRIZI SUBQ)] Inject under the skin.       Allergies   No Known Allergies    Social History    reports that he has never smoked. He has never used smokeless tobacco. He reports that he does not drink alcohol and does not use drugs.      Family History          Review of Systems   A comprehensive review of system was performed and is negative other than that noted in the HPI or here.     Physical Exam   Vital Signs with Ranges  Temp:  [97.7  F (36.5  C)-98.4  F (36.9  C)] 98.4  F (36.9  C)  Pulse:  [101-107] 105  Resp:  [10-29] 20  BP: (149-172)/() 149/89  SpO2:  [94 %-97 %] 97 %  Wt Readings from Last 4 Encounters:   08/13/24 99.8 kg (220 lb)   08/13/24 98 kg (216 lb)   03/20/20 93.9 kg (207 lb)     No intake/output data recorded.      Vitals: BP (!) 149/89   Pulse 105   Temp 98.4  F (36.9  C) (Oral)   Resp 20   Ht 1.702 m (5' 7\")   Wt 99.8 kg (220 lb)   SpO2 97%   BMI 34.46 kg/m      Physical Exam:   General - Alert and oriented to time place and person in no acute distress  Eyes - No scleral icterus  HEENT - Neck supple, moist mucous membranes  Cardiovascular -regular rate and rhythm, no murmur  Extremities - There is no edema  Respiratory -clear  Skin - No pallor or cyanosis  Gastrointestinal - Non tender and non distended without rebound or " "guarding  Psych - Appropriate affect   Neurological - No gross motor neurological focal deficits    No lab results found in last 7 days.    Invalid input(s): \"TROPONINIES\"    Recent Labs   Lab 08/13/24  0432   WBC 7.6   HGB 15.9   MCV 89         POTASSIUM 3.7   CHLORIDE 100   CO2 25   BUN 30.5*   CR 1.02   GFRESTIMATED 81   ANIONGAP 14   ROMINA 9.3   *     Recent Labs   Lab Test 12/29/23  0927 02/17/23  0922   CHOL 222* 215*   HDL 38* 40   * 134*   TRIG 152* 206*     Recent Labs   Lab 08/13/24 0432   WBC 7.6   HGB 15.9   HCT 46.3   MCV 89        No results for input(s): \"PH\", \"PHV\", \"PO2\", \"PO2V\", \"SAT\", \"PCO2\", \"PCO2V\", \"HCO3\", \"HCO3V\" in the last 168 hours.  No results for input(s): \"NTBNPI\", \"NTBNP\" in the last 168 hours.  No results for input(s): \"DD\" in the last 168 hours.  No results for input(s): \"SED\", \"CRP\" in the last 168 hours.  Recent Labs   Lab 08/13/24  0432        No results for input(s): \"TSH\" in the last 168 hours.  No results for input(s): \"COLOR\", \"APPEARANCE\", \"URINEGLC\", \"URINEBILI\", \"URINEKETONE\", \"SG\", \"UBLD\", \"URINEPH\", \"PROTEIN\", \"UROBILINOGEN\", \"NITRITE\", \"LEUKEST\", \"RBCU\", \"WBCU\" in the last 168 hours.    Imaging:  Recent Results (from the past 48 hour(s))   XR Chest Port 1 View    Narrative    EXAM: XR CHEST PORT 1 VIEW  LOCATION: [facility]  DATE/TIME: 8/13/2024 4:37 AM CDT    INDICATION: Chest pain  COMPARISON: Chest x-ray on 4/8/2020      Impression    IMPRESSION: Single AP view of the chest was obtained. Cardiomediastinal silhouette is within normal limits. No suspicious focal pulmonary opacities. No significant pleural effusion or pneumothorax.                   Echo:  No results found for this or any previous visit (from the past 4320 hour(s)).    Clinically Significant Risk Factors Present on Admission                  # Hypertension: Home medication list includes antihypertensive(s)         # Obesity: Estimated " "body mass index is 34.46 kg/m  as calculated from the following:    Height as of this encounter: 1.702 m (5' 7\").    Weight as of this encounter: 99.8 kg (220 lb).               "

## 2024-08-13 NOTE — ED TRIAGE NOTES
Chest pressure going into right shoulder since Thursday. Prior to arrival received 1 nitroglycerin which reduced  chest pressure to 2. Prior to arrival also received 324mg aspirin, 180mg brlianta, 7000u heparin bolus. Rhythm is ST, 105-125.A&O

## 2024-08-13 NOTE — PLAN OF CARE
Problem: Adult Inpatient Plan of Care  Goal: Plan of Care Review  Description: The Plan of Care Review/Shift note should be completed every shift.  The Outcome Evaluation is a brief statement about your assessment that the patient is improving, declining, or no change.  This information will be displayed automatically on your shift  note.  Outcome: Progressing  Flowsheets (Taken 8/13/2024 0830)  Outcome Evaluation:   Pain free, no complaints, TRband site bruised   denies numbness or tingling  Plan of Care Reviewed With: patient  Overall Patient Progress: improving  Goal: Readiness for Transition of Care  Intervention: Mutually Develop Transition Plan  Recent Flowsheet Documentation  Taken 8/13/2024 0800 by Megan Johnson, RN  Equipment Currently Used at Home: none   Goal Outcome Evaluation:      Plan of Care Reviewed With: patient    Overall Patient Progress: improvingOverall Patient Progress: improving    Outcome Evaluation: Pain free, no complaints, TRband site bruised;denies numbness or tingling

## 2024-08-13 NOTE — CONSULTS
Brief Cardiology Note    Pt presented with RCA STEMI, now chest pain free.  Plan for staged PCI to LAD tomorrow.  NPO midnight.  Continue irbesartan.  Start carvedilol 6.25mg BID.  Cont DAPT with ASA and ticagrelor.  Cont atorvastatin 80mg.      Please refer to consult note from earlier today by Dr. Arriaza.  Non-invasive cardiology to follow.    Terry King DO [facility]  Non-invasive Cardiologist  [facility]

## 2024-08-13 NOTE — PLAN OF CARE
Goal Outcome Evaluation:      Plan of Care Reviewed With: patient    Overall Patient Progress: improvingOverall Patient Progress: improving    Outcome Evaluation: Remains pain free, right wrist soft, bruised, +pulses.  Pt aware of NPO status after midnight for procedure tomorrow.  States MD discussed this with him so he is aware.  States no questions or concerns

## 2024-08-13 NOTE — H&P
"[facility]    History and Physical - Hospitalist Service       Date of Admission:  8/13/2024    Assessment & Plan      Moo Orourke is a 69 year old male admitted on 8/13/2024. He presented with intermittent chest pressure which radiates to right arm for the last few days. He was transferred from [facility] with an inferior STEMI. PCI was done and REAGAN placed in mid RCA. Hospitalist service was consulted for admission and medical co-management.    Acute inferior STEMI  -- S/p REAGAN in mid RCA on 08/13  -- Per interventional cards: Recommend staged PCI of LAD/diagonal lesion during this index hospitalization given sluggish flow. The OM lesions can be staged during this hospitalization or in OP  -- TTE pending  -- ASA, Brilinta (for 12 months), Lisinopril and lopressor per cards.   -- Lisinopril switched to ibesartan due to cough per pt.       Essential hypertension  -- ACEI/ARBs and lopressor per cards. Hydrochlorothiazide held to avoid volume depletion.   -- Hydralazin iv prn    CKD-II  -- Avoid nephrotoxins    Hyperlipidemia  -- C/w Lipitor    Psoriasis  -- On Skyrizi    Distal quadriceps tendon rupture  -- S/p open repair on 07/12            Diet: Low Saturated Fat Na <2400 mg    DVT Prophylaxis: Heparin SQ  Butcher Catheter: Not present  Lines: None     Cardiac Monitoring: ACTIVE order. Indication: Post- PCI/Angiogram (24 hours)  Code Status: No Order    Clinically Significant Risk Factors Present on Admission                  # Hypertension: Home medication list includes antihypertensive(s)         # Obesity: Estimated body mass index is 33.14 kg/m  as calculated from the following:    Height as of this encounter: 1.702 m (5' 7\").    Weight as of this encounter: 96 kg (211 lb 9.6 oz).                    Disposition Plan     Medically Ready for Discharge: Anticipated in 2-4 Days           Jessica Sauceda MD  Hospitalist Service  [facility]  Securely message with [employer] " (more info)  Text page via [facility] Paging/Directory     ______________________________________________________________________    Chief Complaint   Intermittent chest pressure    History is obtained from the patient    History of Present Illness   Moo Orourke is a 69 year old male who  has a h/o HTN, Psoriasis presented with intermittent chest pressure which radiates to right arm for the last few days. He was transferred from [facility] with an inferior STEMI. PCI was done and REAGAN placed in mid RCA. Hospitalist service was consulted for admission and medical co-management.      Past Medical History    Past Medical History:   Diagnosis Date    Chronic cough     Facial basal cell cancer     Hypertension     Phimosis of penis     Psoriasis        Past Surgical History   Past Surgical History:   Procedure Laterality Date    CIRCUMCISION N/A 3/24/2020    Procedure: CIRCUMCISION;  Surgeon: Scott Maki MD;  Location: [facility] [place];  Service: Urology    COLONOSCOPY      MOHS MICROGRAPHIC PROCEDURE Left 03/11/2020       Prior to Admission Medications   Prior to Admission Medications   Prescriptions Last Dose Informant Patient Reported? Taking?   Magnesium Oxide 250 MG TABS Past Month at PRN  Yes Yes   Sig: Take 250 mg by mouth daily   Multiple Vitamins-Minerals (ICAPS AREDS 2 PO) 8/12/2024  Yes Yes   Sig: Take 1 tablet by mouth daily   Turmeric 400 MG CAPS Past Week  Yes Yes   Sig: Take 400 mg by mouth daily   coenzyme Q10 125 mg cap Past Month at PRN  Yes Yes   Sig: Take 125 mg by mouth daily   fish oil-omega-3 fatty acids 300-1,000 mg capsule 8/12/2024 at AM  Yes Yes   Sig: [FISH OIL-OMEGA-3 FATTY ACIDS 300-1,000 MG CAPSULE] Take 2 g by mouth daily.   irbesartan-hydrochlorothiazide (AVALIDE) 150-12.5 MG tablet 8/12/2024 at AM  Yes Yes   Sig: Take 1 tablet by mouth daily   multivitamin w/minerals (THERA-VIT-M) tablet 8/12/2024 at AM  Yes Yes   Sig: Take 1 tablet by mouth daily   niacin 100 MG tablet Past Month  Yes  Yes   Sig: Take 100 mg by mouth daily (with breakfast)   risankizumab-rzaa (SKYRIZI SUBQ) 7/5/2024  Yes Yes   Sig: [RISANKIZUMAB-RZAA (SKYRIZI SUBQ)] Inject under the skin.   vitamin D3 (CHOLECALCIFEROL) 50 mcg (2000 units) tablet Past Week  Yes Yes   Sig: Take 2 tablets by mouth daily      Facility-Administered Medications: None        Review of Systems    The 10 point Review of Systems is negative other than noted in the HPI or here.      Physical Exam   Vital Signs: Temp: 98.7  F (37.1  C) Temp src: Oral BP: (!) 144/66 Pulse: 76   Resp: 21 SpO2: 97 % O2 Device: None (Room air) Oxygen Delivery: 2 LPM  Weight: 211 lbs 9.6 oz    GEN: Alert and oriented. Not in acute distress.  HEENT: Atraumatic, mucous membrane- moist and pink.  Chest: Bilateral air entry.  CVS: S1S2 regular.   Abdomen: Soft. Non-tender, non-distended. No organomegaly. No guarding or rigidity. Bowel sounds active.   Extremities: No pedal edema. Right leg immobilizer  CNS: No involuntary movements.  Skin: no cyanosis or clubbing.     Medical Decision Making       76 MINUTES SPENT BY ME on the date of service doing chart review, history, exam, documentation & further activities per the note.      Data

## 2024-08-13 NOTE — Clinical Note
Single balloon inflation. The first balloon was inserted into the left anterior descending and left anterior descending diagonal.Max pressure = 16 lydia. Total duration = 16 seconds.

## 2024-08-13 NOTE — Clinical Note
Stent deployed in the middle right coronary artery. Max pressure = 11 lydia. Total duration = 20 seconds.

## 2024-08-13 NOTE — Clinical Note
The first balloon was inserted into the left anterior descending and middle left anterior descending.Max pressure = 10 lydia. Total duration = 22 seconds.     The second balloon was inserted into the Left Anterior Descending and left anterior decending diagonal. Max pressure = 10 lydia. Total duration = 22 seconds.   Diag balloon 2.5x12 emerge NC.. Max pressure = 10 lydia. Total duration = 22 seconds.

## 2024-08-13 NOTE — Clinical Note
The first balloon was inserted into the right coronary artery and middle right coronary artery.Max pressure = 14 lydia. Total duration = 22 seconds.     Max pressure = 18 lydia. Total duration = 25 seconds.    Balloon reinflated a second time: Max pressure = 18 lydia. Total duration = 25 seconds.

## 2024-08-13 NOTE — Clinical Note
Single balloon inflation. The first balloon was inserted into the left anterior descending and proximal left anterior descending.Max pressure = 16 lydia. Total duration = 16 seconds.

## 2024-08-13 NOTE — ED TRIAGE NOTES
Pt states he has had intermittent chest pressure for the past couple of days. Associated symptoms with the chest pressure are diaphoresis, dizziness and no N/V. Pt states the pressure radiates to the right shoulder and got worse tonight so decided to get it checked out.      Triage Assessment (Adult)       Row Name 08/13/24 0421          Triage Assessment    Airway WDL WDL        Respiratory WDL    Respiratory WDL WDL        Cardiac WDL    Cardiac WDL chest pain        Chest Pain Assessment    Chest Pain Location midsternal     Chest Pain Radiation shoulder     Character pressure     Precipitating Factors nothing     Alleviating Factors other (comment)  deep breaths     Associated Signs/Symptoms diaphoresis;dizziness     Chest Pain Intervention cardiac biomarkers drawn;cardiac monitoring continued;cardiac monitor placed;12-lead ECG obtained;activity minimized        Peripheral/Neurovascular WDL    Peripheral Neurovascular WDL WDL        Cognitive/Neuro/Behavioral WDL    Cognitive/Neuro/Behavioral WDL WDL

## 2024-08-13 NOTE — ED NOTES
Bed: JNED-01  Expected date:   Expected time:   Means of arrival:   Comments:  STEMI from [facility]

## 2024-08-13 NOTE — Clinical Note
Single balloon inflation. The first balloon was inserted into the left anterior descending and proximal left anterior descending.Max pressure = 14 lydia. Total duration = 14 seconds.

## 2024-08-13 NOTE — CONSULTS
8/13 Test claim for Brilinta covered medication covered 100% no cost for the patient. Thank you for allowing me to help with your patient  Bailey Shaikh [facility]  Pharmacy Discharge Liaison [facility]/[place]vetlana giron

## 2024-08-13 NOTE — Clinical Note
Stent deployed in the proximal left anterior descending. Max pressure = 12 lydia. Total duration = 14 seconds.

## 2024-08-14 LAB
ABO/RH(D): NORMAL
ACT BLD: 429 SECONDS (ref 74–150)
ACT BLD: 488 SECONDS (ref 74–150)
ANION GAP SERPL CALCULATED.3IONS-SCNC: 11 MMOL/L (ref 7–15)
ANTIBODY SCREEN: NEGATIVE
ATRIAL RATE - MUSE: 65 BPM
ATRIAL RATE - MUSE: 72 BPM
BASOPHILS # BLD AUTO: 0 10E3/UL (ref 0–0.2)
BASOPHILS NFR BLD AUTO: 0 %
BUN SERPL-MCNC: 18.6 MG/DL (ref 8–23)
CALCIUM SERPL-MCNC: 8.6 MG/DL (ref 8.8–10.4)
CHLORIDE SERPL-SCNC: 101 MMOL/L (ref 98–107)
CREAT SERPL-MCNC: 0.89 MG/DL (ref 0.67–1.17)
DIASTOLIC BLOOD PRESSURE - MUSE: NORMAL MMHG
DIASTOLIC BLOOD PRESSURE - MUSE: NORMAL MMHG
EGFRCR SERPLBLD CKD-EPI 2021: >90 ML/MIN/1.73M2
EOSINOPHIL # BLD AUTO: 0.2 10E3/UL (ref 0–0.7)
EOSINOPHIL NFR BLD AUTO: 2 %
ERYTHROCYTE [DISTWIDTH] IN BLOOD BY AUTOMATED COUNT: 13.3 % (ref 10–15)
GLUCOSE SERPL-MCNC: 112 MG/DL (ref 70–99)
HCO3 SERPL-SCNC: 25 MMOL/L (ref 22–29)
HCT VFR BLD AUTO: 39.6 % (ref 40–53)
HGB BLD-MCNC: 13.5 G/DL (ref 13.3–17.7)
IMM GRANULOCYTES # BLD: 0 10E3/UL
IMM GRANULOCYTES NFR BLD: 0 %
INTERPRETATION ECG - MUSE: NORMAL
INTERPRETATION ECG - MUSE: NORMAL
LYMPHOCYTES # BLD AUTO: 1.6 10E3/UL (ref 0.8–5.3)
LYMPHOCYTES NFR BLD AUTO: 18 %
MCH RBC QN AUTO: 30.1 PG (ref 26.5–33)
MCHC RBC AUTO-ENTMCNC: 34.1 G/DL (ref 31.5–36.5)
MCV RBC AUTO: 88 FL (ref 78–100)
MONOCYTES # BLD AUTO: 1 10E3/UL (ref 0–1.3)
MONOCYTES NFR BLD AUTO: 11 %
NEUTROPHILS # BLD AUTO: 6 10E3/UL (ref 1.6–8.3)
NEUTROPHILS NFR BLD AUTO: 69 %
NRBC # BLD AUTO: 0 10E3/UL
NRBC BLD AUTO-RTO: 0 /100
P AXIS - MUSE: 50 DEGREES
P AXIS - MUSE: 63 DEGREES
PLATELET # BLD AUTO: 176 10E3/UL (ref 150–450)
POTASSIUM SERPL-SCNC: 4.2 MMOL/L (ref 3.4–5.3)
PR INTERVAL - MUSE: 154 MS
PR INTERVAL - MUSE: 154 MS
QRS DURATION - MUSE: 76 MS
QRS DURATION - MUSE: 78 MS
QT - MUSE: 406 MS
QT - MUSE: 412 MS
QTC - MUSE: 428 MS
QTC - MUSE: 444 MS
R AXIS - MUSE: -24 DEGREES
R AXIS - MUSE: 2 DEGREES
RBC # BLD AUTO: 4.49 10E6/UL (ref 4.4–5.9)
SODIUM SERPL-SCNC: 137 MMOL/L (ref 135–145)
SPECIMEN EXPIRATION DATE: NORMAL
SYSTOLIC BLOOD PRESSURE - MUSE: NORMAL MMHG
SYSTOLIC BLOOD PRESSURE - MUSE: NORMAL MMHG
T AXIS - MUSE: -28 DEGREES
T AXIS - MUSE: -40 DEGREES
VENTRICULAR RATE- MUSE: 65 BPM
VENTRICULAR RATE- MUSE: 72 BPM
WBC # BLD AUTO: 8.7 10E3/UL (ref 4–11)

## 2024-08-14 PROCEDURE — 250N000009 HC RX 250: Performed by: INTERNAL MEDICINE

## 2024-08-14 PROCEDURE — 92929 PR PRQ TRLUML CORONARY BM STENT W/ANGIO ADDL ART/BRNCH: CPT | Mod: LC | Performed by: INTERNAL MEDICINE

## 2024-08-14 PROCEDURE — 250N000013 HC RX MED GY IP 250 OP 250 PS 637: Performed by: INTERNAL MEDICINE

## 2024-08-14 PROCEDURE — C1725 CATH, TRANSLUMIN NON-LASER: HCPCS | Performed by: INTERNAL MEDICINE

## 2024-08-14 PROCEDURE — 93454 CORONARY ARTERY ANGIO S&I: CPT | Performed by: INTERNAL MEDICINE

## 2024-08-14 PROCEDURE — 92928 PRQ TCAT PLMT NTRAC ST 1 LES: CPT | Mod: LD | Performed by: INTERNAL MEDICINE

## 2024-08-14 PROCEDURE — 80048 BASIC METABOLIC PNL TOTAL CA: CPT | Performed by: INTERNAL MEDICINE

## 2024-08-14 PROCEDURE — B2111ZZ FLUOROSCOPY OF MULTIPLE CORONARY ARTERIES USING LOW OSMOLAR CONTRAST: ICD-10-PCS | Performed by: INTERNAL MEDICINE

## 2024-08-14 PROCEDURE — 99153 MOD SED SAME PHYS/QHP EA: CPT | Performed by: INTERNAL MEDICINE

## 2024-08-14 PROCEDURE — C1769 GUIDE WIRE: HCPCS | Performed by: INTERNAL MEDICINE

## 2024-08-14 PROCEDURE — C1874 STENT, COATED/COV W/DEL SYS: HCPCS | Performed by: INTERNAL MEDICINE

## 2024-08-14 PROCEDURE — 93010 ELECTROCARDIOGRAM REPORT: CPT | Performed by: STUDENT IN AN ORGANIZED HEALTH CARE EDUCATION/TRAINING PROGRAM

## 2024-08-14 PROCEDURE — 92929 PR PRQ TRLUML CORONARY BM STENT W/ANGIO ADDL ART/BRNCH: CPT | Mod: LD | Performed by: INTERNAL MEDICINE

## 2024-08-14 PROCEDURE — 250N000011 HC RX IP 250 OP 636: Performed by: INTERNAL MEDICINE

## 2024-08-14 PROCEDURE — 36415 COLL VENOUS BLD VENIPUNCTURE: CPT | Performed by: STUDENT IN AN ORGANIZED HEALTH CARE EDUCATION/TRAINING PROGRAM

## 2024-08-14 PROCEDURE — 255N000002 HC RX 255 OP 636: Performed by: INTERNAL MEDICINE

## 2024-08-14 PROCEDURE — C9601 PERC DRUG-EL COR STENT BRAN: HCPCS | Performed by: INTERNAL MEDICINE

## 2024-08-14 PROCEDURE — 85049 AUTOMATED PLATELET COUNT: CPT | Performed by: STUDENT IN AN ORGANIZED HEALTH CARE EDUCATION/TRAINING PROGRAM

## 2024-08-14 PROCEDURE — C9600 PERC DRUG-EL COR STENT SING: HCPCS | Mod: LC | Performed by: INTERNAL MEDICINE

## 2024-08-14 PROCEDURE — 027237Z DILATION OF CORONARY ARTERY, THREE ARTERIES WITH FOUR OR MORE DRUG-ELUTING INTRALUMINAL DEVICES, PERCUTANEOUS APPROACH: ICD-10-PCS | Performed by: INTERNAL MEDICINE

## 2024-08-14 PROCEDURE — C1887 CATHETER, GUIDING: HCPCS | Performed by: INTERNAL MEDICINE

## 2024-08-14 PROCEDURE — 120N000004 HC R&B MS OVERFLOW

## 2024-08-14 PROCEDURE — 99232 SBSQ HOSP IP/OBS MODERATE 35: CPT | Performed by: STUDENT IN AN ORGANIZED HEALTH CARE EDUCATION/TRAINING PROGRAM

## 2024-08-14 PROCEDURE — 250N000013 HC RX MED GY IP 250 OP 250 PS 637: Performed by: NURSE PRACTITIONER

## 2024-08-14 PROCEDURE — 93005 ELECTROCARDIOGRAM TRACING: CPT

## 2024-08-14 PROCEDURE — 99152 MOD SED SAME PHYS/QHP 5/>YRS: CPT | Performed by: INTERNAL MEDICINE

## 2024-08-14 PROCEDURE — C1894 INTRO/SHEATH, NON-LASER: HCPCS | Performed by: INTERNAL MEDICINE

## 2024-08-14 PROCEDURE — 93010 ELECTROCARDIOGRAM REPORT: CPT | Mod: 77 | Performed by: INTERNAL MEDICINE

## 2024-08-14 PROCEDURE — 86900 BLOOD TYPING SEROLOGIC ABO: CPT | Performed by: NURSE PRACTITIONER

## 2024-08-14 PROCEDURE — 258N000003 HC RX IP 258 OP 636: Performed by: INTERNAL MEDICINE

## 2024-08-14 PROCEDURE — 250N000013 HC RX MED GY IP 250 OP 250 PS 637: Performed by: STUDENT IN AN ORGANIZED HEALTH CARE EDUCATION/TRAINING PROGRAM

## 2024-08-14 PROCEDURE — 272N000001 HC OR GENERAL SUPPLY STERILE: Performed by: INTERNAL MEDICINE

## 2024-08-14 PROCEDURE — 85347 COAGULATION TIME ACTIVATED: CPT

## 2024-08-14 DEVICE — STENT CORONARY DES SYNERGY XD MR US 2.50X20MM H7493941820250: Type: IMPLANTABLE DEVICE | Status: FUNCTIONAL

## 2024-08-14 DEVICE — STENT CORONARY DES SYNERGY XD MR US 2.25X32MM H7493941832220: Type: IMPLANTABLE DEVICE | Site: CORONARY | Status: FUNCTIONAL

## 2024-08-14 DEVICE — STENT COR ONYX FRONTIER 34X3MM ONYXNG30034UX: Type: IMPLANTABLE DEVICE | Site: CORONARY | Status: FUNCTIONAL

## 2024-08-14 DEVICE — STENT COR ONYX FRONTIER 18X2.50MM ONYXNG25018UX: Type: IMPLANTABLE DEVICE | Site: CORONARY | Status: FUNCTIONAL

## 2024-08-14 DEVICE — STENT COR ONYX FRONTIER 12X3.50MM ONYXNG35012UX: Type: IMPLANTABLE DEVICE | Site: CORONARY | Status: FUNCTIONAL

## 2024-08-14 RX ORDER — ASPIRIN 81 MG/1
243 TABLET, CHEWABLE ORAL ONCE
Status: COMPLETED | OUTPATIENT
Start: 2024-08-14 | End: 2024-08-14

## 2024-08-14 RX ORDER — IODIXANOL 320 MG/ML
INJECTION, SOLUTION INTRAVASCULAR
Status: DISCONTINUED | OUTPATIENT
Start: 2024-08-14 | End: 2024-08-14 | Stop reason: HOSPADM

## 2024-08-14 RX ORDER — DIAZEPAM 5 MG
5 TABLET ORAL
Status: COMPLETED | OUTPATIENT
Start: 2024-08-14 | End: 2024-08-14

## 2024-08-14 RX ORDER — SODIUM CHLORIDE 9 MG/ML
INJECTION, SOLUTION INTRAVENOUS CONTINUOUS
Status: ACTIVE | OUTPATIENT
Start: 2024-08-14 | End: 2024-08-14

## 2024-08-14 RX ORDER — NALOXONE HYDROCHLORIDE 0.4 MG/ML
0.2 INJECTION, SOLUTION INTRAMUSCULAR; INTRAVENOUS; SUBCUTANEOUS
Status: DISCONTINUED | OUTPATIENT
Start: 2024-08-14 | End: 2024-08-15 | Stop reason: HOSPADM

## 2024-08-14 RX ORDER — HEPARIN SODIUM 1000 [USP'U]/ML
INJECTION, SOLUTION INTRAVENOUS; SUBCUTANEOUS
Status: DISCONTINUED | OUTPATIENT
Start: 2024-08-14 | End: 2024-08-14 | Stop reason: HOSPADM

## 2024-08-14 RX ORDER — LIDOCAINE 40 MG/G
CREAM TOPICAL
Status: DISCONTINUED | OUTPATIENT
Start: 2024-08-14 | End: 2024-08-14 | Stop reason: HOSPADM

## 2024-08-14 RX ORDER — ASPIRIN 81 MG/1
81 TABLET ORAL DAILY
Qty: 30 TABLET | Refills: 3 | Status: SHIPPED | OUTPATIENT
Start: 2024-08-15

## 2024-08-14 RX ORDER — FLUMAZENIL 0.1 MG/ML
0.2 INJECTION, SOLUTION INTRAVENOUS
Status: ACTIVE | OUTPATIENT
Start: 2024-08-14 | End: 2024-08-14

## 2024-08-14 RX ORDER — FENTANYL CITRATE 50 UG/ML
25 INJECTION, SOLUTION INTRAMUSCULAR; INTRAVENOUS
Status: DISCONTINUED | OUTPATIENT
Start: 2024-08-14 | End: 2024-08-14 | Stop reason: HOSPADM

## 2024-08-14 RX ORDER — SODIUM CHLORIDE 9 MG/ML
INJECTION, SOLUTION INTRAVENOUS CONTINUOUS
Status: DISCONTINUED | OUTPATIENT
Start: 2024-08-14 | End: 2024-08-14 | Stop reason: HOSPADM

## 2024-08-14 RX ORDER — NALOXONE HYDROCHLORIDE 0.4 MG/ML
0.4 INJECTION, SOLUTION INTRAMUSCULAR; INTRAVENOUS; SUBCUTANEOUS
Status: DISCONTINUED | OUTPATIENT
Start: 2024-08-14 | End: 2024-08-15 | Stop reason: HOSPADM

## 2024-08-14 RX ORDER — FENTANYL CITRATE 50 UG/ML
INJECTION, SOLUTION INTRAMUSCULAR; INTRAVENOUS
Status: DISCONTINUED | OUTPATIENT
Start: 2024-08-14 | End: 2024-08-14 | Stop reason: HOSPADM

## 2024-08-14 RX ORDER — NITROGLYCERIN 5 MG/ML
VIAL (ML) INTRAVENOUS
Status: DISCONTINUED | OUTPATIENT
Start: 2024-08-14 | End: 2024-08-14 | Stop reason: HOSPADM

## 2024-08-14 RX ORDER — ATROPINE SULFATE 0.1 MG/ML
0.5 INJECTION INTRAVENOUS
Status: ACTIVE | OUTPATIENT
Start: 2024-08-14 | End: 2024-08-14

## 2024-08-14 RX ORDER — ASPIRIN 325 MG
325 TABLET ORAL ONCE
Status: COMPLETED | OUTPATIENT
Start: 2024-08-14 | End: 2024-08-14

## 2024-08-14 RX ADMIN — TICAGRELOR 90 MG: 90 TABLET ORAL at 06:36

## 2024-08-14 RX ADMIN — IRBESARTAN 150 MG: 150 TABLET ORAL at 08:29

## 2024-08-14 RX ADMIN — CARVEDILOL 6.25 MG: 3.12 TABLET, FILM COATED ORAL at 08:27

## 2024-08-14 RX ADMIN — TICAGRELOR 90 MG: 90 TABLET ORAL at 17:17

## 2024-08-14 RX ADMIN — ASPIRIN 81 MG: 81 TABLET, COATED ORAL at 08:27

## 2024-08-14 RX ADMIN — ASPIRIN 81 MG CHEWABLE TABLET 243 MG: 81 TABLET CHEWABLE at 10:33

## 2024-08-14 RX ADMIN — DIAZEPAM 5 MG: 5 TABLET ORAL at 10:01

## 2024-08-14 RX ADMIN — MAGNESIUM OXIDE TAB 400 MG (241.3 MG ELEMENTAL MG) 200 MG: 400 (241.3 MG) TAB at 08:29

## 2024-08-14 RX ADMIN — SODIUM CHLORIDE: 9 INJECTION, SOLUTION INTRAVENOUS at 13:54

## 2024-08-14 RX ADMIN — ATORVASTATIN CALCIUM 80 MG: 40 TABLET, FILM COATED ORAL at 08:27

## 2024-08-14 RX ADMIN — CARVEDILOL 6.25 MG: 3.12 TABLET, FILM COATED ORAL at 17:17

## 2024-08-14 ASSESSMENT — ACTIVITIES OF DAILY LIVING (ADL)
ADLS_ACUITY_SCORE: 35
ADLS_ACUITY_SCORE: 38
ADLS_ACUITY_SCORE: 35
ADLS_ACUITY_SCORE: 36
ADLS_ACUITY_SCORE: 35
ADLS_ACUITY_SCORE: 38
ADLS_ACUITY_SCORE: 36
ADLS_ACUITY_SCORE: 35
ADLS_ACUITY_SCORE: 38
ADLS_ACUITY_SCORE: 36
ADLS_ACUITY_SCORE: 38
ADLS_ACUITY_SCORE: 38
ADLS_ACUITY_SCORE: 35
ADLS_ACUITY_SCORE: 38
ADLS_ACUITY_SCORE: 35
ADLS_ACUITY_SCORE: 36
ADLS_ACUITY_SCORE: 38

## 2024-08-14 NOTE — PROGRESS NOTES
[facility] HEART CARE   [address], [place] [address]   [email or web address]   OFFICE: [phone]     CARDIOLOGY INPATIENT PROGRESS NOTE     Impression and Plan     Assessment/Plan:  CAD s/p RCA STEMI 8/13   - Staged PCI done today with stents to LAD, D1, OM2, OM3   - Cont DAPT with ASA 81 mg indefinitely, ticagrelor x 12 months.  Likely switch to plavix and continue indefinitely at 12 month vincent given stent burden.   - Cont BB and ARB   - .  Continue atorvastatin 80mg. Plan on repeat FLP in 2 months   - Recommend CR   - Lifestyle modification    LV dysfunction   - Plan on repeat TTE in 3 months    HTN   - Well controlled   - Continue current management    Will continue to follow  Anticipate discharge tomorrow      Subjective     Moo Orourke is feeling great.        Review of Systems:  Further review of systems is otherwise negative/noncontributory (based on review of medical record (admission H&P) and 13 point review of systems reviewed. Pertinent positives noted).    Cardiac Diagnostics     ECG: Personally reviewed and interpreted: 8/14/2024  NSR  Inferior infarct  Possible anterior infarct    Telemetry (personally reviewed):   NSR    Most recent:  Echocardiogram (results reviewed): 8/13/2024  Left ventricular function is decreased. The ejection fraction is 50-55%  (borderline).  There is severe anterior, septal, and apical wall hypokinesis.  Normal right ventricle size and systolic function.  No hemodynamically significant valvular abnormalities on 2D or color flow  imaging.    Cardiac Cath (results reviewed): 8/14/2024    1st Mrg lesion is 70% stenosed.    2nd Mrg lesion is 90% stenosed.    3rd Mrg lesion is 80% stenosed.    Mid LAD lesion is 99% stenosed.    1st Diag lesion is 85% stenosed.    Prox LAD lesion is 60% stenosed.     Successful PCI LAD/diagonal stenosis with modified DK crush technique: 3.0 x 34 [place] REAGAN mid LAD, 3.5 x 12 [place] REAGAN proximal  LAD, 2.5 x 18 [place] REAGAN diagonal.  0% residual all lesions, MICHEAL-3 flow.  Successful PCI LCx OM 2 with 2.25 x 32 Sy[place] REAGAN x 1.  0% residual MICHEAL-3 flow.  Successful PCI LCx OM 3 with 2.5 x 20 Synergy REAGAN x 1.  0% residual MICHEAL-3 flow  RCA stent remains widely patent.          Medical History  Surgical History Family History Social History   Past Medical History:   Diagnosis Date    Chronic cough     Facial basal cell cancer     Hypertension     Phimosis of penis     Psoriasis      Past Surgical History:   Procedure Laterality Date    CIRCUMCISION N/A 3/24/2020    Procedure: CIRCUMCISION;  Surgeon: Scott Maki MD;  Location: [facility] [place], [place];  Service: Urology    COLONOSCOPY      CV CORONARY ANGIOGRAM N/A 8/13/2024    Procedure: Coronary Angiogram;  Surgeon: Prasanna Arriaza MD;  Location: [facility] CATH LAB CV    CV INTRAVASULAR ULTRASOUND N/A 8/13/2024    Procedure: Intravascular Ultrasound;  Surgeon: Prasanna Arriaza MD;  Location: [facility] CATH LAB CV    CV PCI STENT DRUG ELUTING N/A 8/13/2024    Procedure: Percutaneous Coronary Intervention Stent;  Surgeon: Prasanna Arriaza MD;  Location: [facility] CATH LAB CV    MOHS MICROGRAPHIC PROCEDURE Left 03/11/2020     No family history on file.        Social History     Socioeconomic History    Marital status:      Spouse name: Not on file    Number of children: Not on file    Years of education: Not on file    Highest education level: Not on file   Occupational History    Not on file   Tobacco Use    Smoking status: Never    Smokeless tobacco: Never   Substance and Sexual Activity    Alcohol use: Never    Drug use: Never    Sexual activity: Not on file   Other Topics Concern    Not on file   Social History Narrative    Not on file     Social Determinants of Health     Financial Resource Strain: Not on file   Food Insecurity: Not on file   Transportation Needs: Not on file   Physical Activity: Not on file   Stress: Not on file   Social Connections:  "Not on file   Interpersonal Safety: Not on file   Housing Stability: Not on file             Physical Examination   VITALS: /59   Pulse 65   Temp 97.9  F (36.6  C) (Oral)   Resp 18   Ht 1.702 m (5' 7\")   Wt 96.3 kg (212 lb 6.4 oz)   SpO2 95%   BMI 33.27 kg/m    BMI: Body mass index is 33.27 kg/m .  Wt Readings from Last 3 Encounters:   08/14/24 96.3 kg (212 lb 6.4 oz)   08/13/24 98 kg (216 lb)   03/20/20 93.9 kg (207 lb)     No intake or output data in the 24 hours ending 08/14/24 1653    General: pleasant male. No acute distress.   HENT: external ears normal. Nares patent. Mucous membranes moist.  Neck: No JVD  Lungs: clear to auscultation  COR:  regular rate and rhythm, No murmurs, rubs, or gallops  Abd: nondistended, BS present  Extrem: No edema            Lab Results Reviewed    Chemistry/lipid CBC Cardiac Enzymes/BNP/TSH/INR   Recent Labs   Lab Test 08/13/24  0702   CHOL 171   HDL 37*   *   TRIG 49     Recent Labs   Lab Test 08/13/24  0702 12/29/23  0927 02/17/23  0922   * 154* 134*     Recent Labs   Lab Test 08/14/24  0440      POTASSIUM 4.2   CHLORIDE 101   CO2 25   *   BUN 18.6   CR 0.89   GFRESTIMATED >90   ROMINA 8.6*     Recent Labs   Lab Test 08/14/24  0440 08/13/24  0432 07/10/24  1409   CR 0.89 1.02 1.13     Recent Labs   Lab Test 12/29/23  0927   A1C 6.1*          Recent Labs   Lab Test 08/14/24  0440   WBC 8.7   HGB 13.5   HCT 39.6*   MCV 88        Recent Labs   Lab Test 08/14/24  0440 08/13/24  0432 12/29/23  0927   HGB 13.5 15.9 15.6    No results for input(s): \"TROPONINI\" in the last 92193 hours.  No results for input(s): \"BNP\", \"NTBNPI\", \"NTBNP\" in the last 91860 hours.  No results for input(s): \"TSH\" in the last 33289 hours.  Recent Labs   Lab Test 08/13/24  0432   INR 1.11           Current Inpatient Scheduled Medications   Scheduled Meds:  Current Facility-Administered Medications   Medication Dose Route Frequency Provider Last Rate Last Admin    " aspirin EC tablet 81 mg  81 mg Oral Daily Adwoa Barcenas CNP   81 mg at 08/14/24 0827    atorvastatin (LIPITOR) tablet 80 mg  80 mg Oral Daily Adwoa Barcenas CNP   80 mg at 08/14/24 0827    carvedilol (COREG) tablet 6.25 mg  6.25 mg Oral BID w/meals Adwoa Barcenas, CNP   6.25 mg at 08/14/24 0827    [Held by provider] hydroCHLOROthiazide tablet 12.5 mg  12.5 mg Oral Daily Adwoa Barcenas CNP        irbesartan (AVAPRO) tablet 150 mg  150 mg Oral Daily Adwoa Barcenas CNP   150 mg at 08/14/24 0829    magnesium oxide (MAG-OX) half-tab 200 mg  200 mg Oral Daily Adwoa Barcenas CNP   200 mg at 08/14/24 0829    ticagrelor (BRILINTA) tablet 90 mg  90 mg Oral Q12H BOB (08/20) Adwoa Barcenas CNP   90 mg at 08/14/24 0636     Continuous Infusions:  Current Facility-Administered Medications   Medication Dose Route Frequency Provider Last Rate Last Admin    Continuing antiplatelet from home medication list OR antiplatelet order already placed during this visit   Does not apply DOES NOT GO TO Pasha Bains MD        Continuing beta blocker from home medication list OR beta blocker order already placed during this visit   Does not apply DOES NOT GO TO Pasha Bains MD        Continuing statin from home medication list OR statin order already placed during this visit   Does not apply DOES NOT GO TO Pasha Bains MD        Percutaneous Coronary Intervention orders placed (this is information for BPA alerting)   Does not apply DOES NOT GO TO Adwoa Ashton CNP        Percutaneous Coronary Intervention orders placed (this is information for BPA alerting)   Does not apply DOES NOT GO TO Adwoa Ashton CNP        reason aspirin not prescribed (intentional)   Other DOES NOT GO TO Pasha Bains MD        sodium chloride 0.9 % infusion   Intravenous Continuous Pasha Musa MD 75 mL/hr at 08/14/24 1354 New Bag at  08/14/24 1354       Current Outpatient Medications   Medication Sig Dispense Refill    [START ON 8/15/2024] aspirin 81 MG EC tablet Take 1 tablet (81 mg) by mouth daily Start tomorrow. 30 tablet 3    aspirin 81 MG EC tablet Take 1 tablet (81 mg) by mouth daily Start tomorrow. 30 tablet 3    atorvastatin (LIPITOR) 40 MG tablet Take 1 tablet (40 mg) by mouth daily 90 tablet 3          Medications Prior to Admission   Prior to Admission medications    Medication Sig Start Date End Date Taking? Authorizing Provider   aspirin 81 MG EC tablet Take 1 tablet (81 mg) by mouth daily Start tomorrow. 8/15/24  Yes Pasha Musa MD   aspirin 81 MG EC tablet Take 1 tablet (81 mg) by mouth daily Start tomorrow. 8/14/24  Yes Prasanna Arriaza MD   atorvastatin (LIPITOR) 40 MG tablet Take 1 tablet (40 mg) by mouth daily 8/13/24  Yes Prasanna Arriaza MD   coenzyme Q10 125 mg cap Take 125 mg by mouth daily 3/20/20  Yes Provider, Historical   fish oil-omega-3 fatty acids 300-1,000 mg capsule [FISH OIL-OMEGA-3 FATTY ACIDS 300-1,000 MG CAPSULE] Take 2 g by mouth daily. 3/20/20  Yes Provider, Historical   irbesartan-hydrochlorothiazide (AVALIDE) 150-12.5 MG tablet Take 1 tablet by mouth daily   Yes Unknown, Entered By History   Magnesium Oxide 250 MG TABS Take 250 mg by mouth daily   Yes Unknown, Entered By History   Multiple Vitamins-Minerals (ICAPS AREDS 2 PO) Take 1 tablet by mouth daily   Yes Unknown, Entered By History   multivitamin w/minerals (THERA-VIT-M) tablet Take 1 tablet by mouth daily   Yes Unknown, Entered By History   niacin 100 MG tablet Take 100 mg by mouth daily (with breakfast)   Yes Unknown, Entered By History   risankizumab-rzaa (SKYRIZI SUBQ) [RISANKIZUMAB-RZAA (SKYRIZI SUBQ)] Inject under the skin. 3/20/20  Yes Provider, Historical   Turmeric 400 MG CAPS Take 400 mg by mouth daily   Yes Unknown, Entered By History   vitamin D3 (CHOLECALCIFEROL) 50 mcg (2000 units) tablet Take 2 tablets by mouth daily   Yes  Unknown, Entered By History          Terry King DO [facility]  Non-invasive Cardiologist  [facility]

## 2024-08-14 NOTE — PRE-PROCEDURE
GENERAL PRE-PROCEDURE:   Procedure:  Coronaruy angiogram with possible PCI, left heart catheterization  Date/Time:  8/14/2024 9:08 AM    Written consent obtained?: Yes    Risks and benefits: Risks, benefits and alternatives were discussed    Consent given by:  Patient  Patient states understanding of procedure being performed: Yes    Patient's understanding of procedure matches consent: Yes    Procedure consent matches procedure scheduled: Yes    Expected level of sedation:  Moderate  Appropriately NPO:  Yes  ASA Class:  4 (recent inferior STEMI; s/p RCA PCI, HTN)  Mallampati  :  Grade 2- soft palate, base of uvula, tonsillar pillars, and portion of posterior pharyngeal wall visible  Lungs:  Lungs clear with good breath sounds bilaterally  Heart:  Normal heart sounds and rate  History & Physical reviewed:  History and physical reviewed and updates made (see comment)  H&P Comments:  Clinically Significant Risk Factors Present on Admission    Cardiovascular : recent inferior STEMI; s/p RCA PCI, HTN    Fluid & Electrolyte Disorders : Not present on admission    Gastroenterology : Not present on admission    Hematology/Oncology : Not present on admission    Nephrology : Not present on admission    Neurology : Not present on admission    Pulmonology : Not present on admission    Systemic : Not present on admission    Statement of review:  I have reviewed the lab findings, diagnostic data, medications, and the plan for sedation    Chart reviewed. Patient seen and examined.   Questions answered.   Right radial pulse good.  Agree with assessment and plan outlined above by KONG Barcenas CNP  Will proceed with study as planned.  Pasha Musa MD on 8/14/2024 at 10:56 AM

## 2024-08-14 NOTE — CONSULTS
Nutrition Services-Brief Note    MD consult: Dietitian to see for Heart Healthy Diet education     Pt seen by RD yesterday for education, see note from 8/13. No further education needed

## 2024-08-14 NOTE — PROGRESS NOTES
"[facility]    Medicine Progress Note - Hospitalist Service    Date of Admission:  8/13/2024    Assessment & Plan      Moo Orourke is a 69 year old male admitted on 8/13/2024. He presented with intermittent chest pressure which radiates to right arm for the last few days. He was transferred from [facility] with an inferior STEMI. PCI was done and REAGAN placed in mid RCA. Hospitalist service was consulted for admission and medical co-management.    Acute inferior STEMI  LV dysfunction  -- S/p REAGAN in mid RCA on 08/13  -- Per interventional cards: Recommend staged PCI of LAD/diagonal lesion during this index hospitalization given sluggish flow. The OM lesions can be staged during this hospitalization or in OP  -- TTE on 08/13: LVEF: 50-55%. Severe anterior, septal, and apical wall hypokinesis.   -- ASA, Brilinta (for 12 months), tfollowed by either Plavix or ASA monotherapy. Lisinopril and lopressor per cards.   -- Lisinopril switched to ibesartan due to cough per pt.   -- S/p staged PCI on 08/14 with stenst to LD, D1, OM2, OM3.    -- Repeat TTE in 3 months per cards  Essential hypertension  -- ACEI/ARBs and lopressor per cards. Hydrochlorothiazide held to avoid volume depletion.   -- Hydralazin iv prn    CKD-II  -- Avoid nephrotoxins    Hyperlipidemia  -- C/w Lipitor    Psoriasis  -- On Skyrizi    Distal quadriceps tendon rupture  -- S/p open repair on 07/12            Diet: Low Saturated Fat Na <2400 mg    DVT Prophylaxis: Pneumatic Compression Devices  Butcher Catheter: Not present  Lines: None     Cardiac Monitoring: ACTIVE order. Indication: Post- PCI/Angiogram (24 hours)  Code Status: Full Code      Clinically Significant Risk Factors                            # Obesity: Estimated body mass index is 33.27 kg/m  as calculated from the following:    Height as of this encounter: 1.702 m (5' 7\").    Weight as of this encounter: 96.3 kg (212 lb 6.4 oz)., PRESENT ON ADMISSION              "     Disposition Plan     Medically Ready for Discharge: Anticipated Tomorrow             Jessica Sauceda MD  Hospitalist Service  [facility]  Securely message with [employer] (more info)  Text page via [employer] Paging/Directory   ______________________________________________________________________    Interval History   Patient is seen and examined at bedside.   No acute complaints. He was anxious before procedure.   Plan of care discussed with patient. All questions answered. Pt verbalized understanding.     Physical Exam   Vital Signs: Temp: 97.9  F (36.6  C) Temp src: Oral BP: 133/68 Pulse: 73   Resp: 18 SpO2: 98 % O2 Device: None (Room air) Oxygen Delivery: 2 LPM  Weight: 212 lbs 6.4 oz    GEN: Alert and oriented. Not in acute distress.  HEENT: Atraumatic, mucous membrane- moist and pink.  Chest: Bilateral air entry.  CVS: S1S2 regular.   Abdomen: Soft. Non-tender, non-distended. No organomegaly. No guarding or rigidity. Bowel sounds active.   Extremities: No pedal edema. Right leg immobilizer.  CNS: No involuntary movements.  Skin: no cyanosis or clubbing.     Medical Decision Making       48 MINUTES SPENT BY ME on the date of service doing chart review, history, exam, documentation & further activities per the note.      Data

## 2024-08-14 NOTE — PLAN OF CARE
Goal Outcome Evaluation:      Plan of Care Reviewed With: patient    Overall Patient Progress: improvingOverall Patient Progress: improving           TR band off at 1605. No pain, swelling, hematoma.   Knee brace on RLE.     AOx4; VSS on RA; NSR on tele; denies pain, CP, SOB, dizziness.  Able to make needs known, call light in reach.    Lilibeth Interiano RN

## 2024-08-14 NOTE — PLAN OF CARE
Problem: Adult Inpatient Plan of Care  Goal: Optimal Comfort and Wellbeing  Outcome: Progressing     Problem: Acute Coronary Syndrome  Goal: Optimal Adaptation to Illness  Outcome: Progressing  Goal: Absence of Cardiac-Related Pain  Outcome: Progressing  Goal: Normalized Cardiac Rhythm  Outcome: Progressing  Goal: Effective Cardiac Pump Function  Outcome: Progressing  Goal: Adequate Tissue Perfusion  Outcome: Progressing  Intervention: Optimize Cardiac Tissue Perfusion  Recent Flowsheet Documentation  Taken 8/14/2024 0200 by Elzbieta Loza RN  Activity Management: ambulated to bathroom   Goal Outcome Evaluation:       Pt denies pain. Vitals stable. BP slightly elevated.  Pt states feeling a little anxious for angiogram today.  Did not sleep much. Npo since midnight.     Elzbieta Gandara RN

## 2024-08-15 ENCOUNTER — APPOINTMENT (OUTPATIENT)
Dept: OCCUPATIONAL THERAPY | Facility: HOSPITAL | Age: 69
DRG: 321 | End: 2024-08-15
Attending: INTERNAL MEDICINE
Payer: COMMERCIAL

## 2024-08-15 VITALS
DIASTOLIC BLOOD PRESSURE: 60 MMHG | SYSTOLIC BLOOD PRESSURE: 130 MMHG | HEIGHT: 67 IN | OXYGEN SATURATION: 97 % | BODY MASS INDEX: 33.34 KG/M2 | RESPIRATION RATE: 18 BRPM | WEIGHT: 212.4 LBS | TEMPERATURE: 98.6 F | HEART RATE: 70 BPM

## 2024-08-15 LAB
ANION GAP SERPL CALCULATED.3IONS-SCNC: 9 MMOL/L (ref 7–15)
ATRIAL RATE - MUSE: 82 BPM
BUN SERPL-MCNC: 20.2 MG/DL (ref 8–23)
CALCIUM SERPL-MCNC: 8.5 MG/DL (ref 8.8–10.4)
CHLORIDE SERPL-SCNC: 103 MMOL/L (ref 98–107)
CREAT SERPL-MCNC: 0.87 MG/DL (ref 0.67–1.17)
DIASTOLIC BLOOD PRESSURE - MUSE: NORMAL MMHG
EGFRCR SERPLBLD CKD-EPI 2021: >90 ML/MIN/1.73M2
GLUCOSE SERPL-MCNC: 118 MG/DL (ref 70–99)
HCO3 SERPL-SCNC: 25 MMOL/L (ref 22–29)
INTERPRETATION ECG - MUSE: NORMAL
P AXIS - MUSE: 59 DEGREES
POTASSIUM SERPL-SCNC: 4.2 MMOL/L (ref 3.4–5.3)
PR INTERVAL - MUSE: 150 MS
QRS DURATION - MUSE: 74 MS
QT - MUSE: 380 MS
QTC - MUSE: 443 MS
R AXIS - MUSE: 20 DEGREES
SODIUM SERPL-SCNC: 137 MMOL/L (ref 135–145)
SYSTOLIC BLOOD PRESSURE - MUSE: NORMAL MMHG
T AXIS - MUSE: -34 DEGREES
VENTRICULAR RATE- MUSE: 82 BPM

## 2024-08-15 PROCEDURE — 97165 OT EVAL LOW COMPLEX 30 MIN: CPT | Mod: GO

## 2024-08-15 PROCEDURE — 80048 BASIC METABOLIC PNL TOTAL CA: CPT | Performed by: INTERNAL MEDICINE

## 2024-08-15 PROCEDURE — 93010 ELECTROCARDIOGRAM REPORT: CPT | Performed by: STUDENT IN AN ORGANIZED HEALTH CARE EDUCATION/TRAINING PROGRAM

## 2024-08-15 PROCEDURE — 250N000013 HC RX MED GY IP 250 OP 250 PS 637: Performed by: NURSE PRACTITIONER

## 2024-08-15 PROCEDURE — 36415 COLL VENOUS BLD VENIPUNCTURE: CPT | Performed by: INTERNAL MEDICINE

## 2024-08-15 PROCEDURE — 93005 ELECTROCARDIOGRAM TRACING: CPT

## 2024-08-15 PROCEDURE — 99232 SBSQ HOSP IP/OBS MODERATE 35: CPT | Performed by: STUDENT IN AN ORGANIZED HEALTH CARE EDUCATION/TRAINING PROGRAM

## 2024-08-15 PROCEDURE — 97535 SELF CARE MNGMENT TRAINING: CPT | Mod: GO

## 2024-08-15 PROCEDURE — 99239 HOSP IP/OBS DSCHRG MGMT >30: CPT | Performed by: STUDENT IN AN ORGANIZED HEALTH CARE EDUCATION/TRAINING PROGRAM

## 2024-08-15 RX ORDER — ATORVASTATIN CALCIUM 80 MG/1
80 TABLET, FILM COATED ORAL DAILY
Qty: 30 TABLET | Refills: 2 | Status: SHIPPED | OUTPATIENT
Start: 2024-08-16 | End: 2024-09-30

## 2024-08-15 RX ORDER — CARVEDILOL 6.25 MG/1
6.25 TABLET ORAL 2 TIMES DAILY WITH MEALS
Qty: 60 TABLET | Refills: 2 | Status: SHIPPED | OUTPATIENT
Start: 2024-08-15 | End: 2024-09-30

## 2024-08-15 RX ORDER — CARVEDILOL 6.25 MG/1
6.25 TABLET ORAL 2 TIMES DAILY WITH MEALS
Qty: 60 TABLET | Refills: 2 | Status: CANCELLED | OUTPATIENT
Start: 2024-08-15

## 2024-08-15 RX ORDER — ATORVASTATIN CALCIUM 40 MG/1
80 TABLET, FILM COATED ORAL DAILY
Qty: 60 TABLET | Refills: 3 | Status: CANCELLED | OUTPATIENT
Start: 2024-08-15

## 2024-08-15 RX ORDER — IRBESARTAN 150 MG/1
150 TABLET ORAL DAILY
Qty: 30 TABLET | Refills: 2 | Status: SHIPPED | OUTPATIENT
Start: 2024-08-16 | End: 2024-09-30

## 2024-08-15 RX ORDER — IRBESARTAN 150 MG/1
150 TABLET ORAL DAILY
Qty: 30 TABLET | Refills: 2 | Status: CANCELLED | OUTPATIENT
Start: 2024-08-16

## 2024-08-15 RX ADMIN — CARVEDILOL 6.25 MG: 3.12 TABLET, FILM COATED ORAL at 08:28

## 2024-08-15 RX ADMIN — ASPIRIN 81 MG: 81 TABLET, COATED ORAL at 08:28

## 2024-08-15 RX ADMIN — TICAGRELOR 90 MG: 90 TABLET ORAL at 06:47

## 2024-08-15 RX ADMIN — MAGNESIUM OXIDE TAB 400 MG (241.3 MG ELEMENTAL MG) 200 MG: 400 (241.3 MG) TAB at 08:28

## 2024-08-15 RX ADMIN — ATORVASTATIN CALCIUM 80 MG: 40 TABLET, FILM COATED ORAL at 08:28

## 2024-08-15 RX ADMIN — IRBESARTAN 150 MG: 150 TABLET ORAL at 08:28

## 2024-08-15 ASSESSMENT — ACTIVITIES OF DAILY LIVING (ADL)
ADLS_ACUITY_SCORE: 36
ADLS_ACUITY_SCORE: 38
ADLS_ACUITY_SCORE: 37
ADLS_ACUITY_SCORE: 38
ADLS_ACUITY_SCORE: 35
ADLS_ACUITY_SCORE: 35
ADLS_ACUITY_SCORE: 38
ADLS_ACUITY_SCORE: 36
ADLS_ACUITY_SCORE: 38
ADLS_ACUITY_SCORE: 38
ADLS_ACUITY_SCORE: 36
ADLS_ACUITY_SCORE: 36
ADLS_ACUITY_SCORE: 38

## 2024-08-15 NOTE — PROGRESS NOTES
SPIRITUAL HEALTH SERVICES Note     Saw pt Moo Orourke and administered Mosque sacrament of anointing for the healing of the sick.    Fr. Rob Perez

## 2024-08-15 NOTE — DISCHARGE SUMMARY
"[facility]  Hospitalist Discharge Summary      Date of Admission:  8/13/2024  Date of Discharge:  8/15/2024  Discharging Provider: Jessica Sauceda MD  Discharge Service: Hospitalist Service    Discharge Diagnoses   STEMI    Clinically Significant Risk Factors     # Obesity: Estimated body mass index is 33.27 kg/m  as calculated from the following:    Height as of this encounter: 1.702 m (5' 7\").    Weight as of this encounter: 96.3 kg (212 lb 6.4 oz).       Follow-ups Needed After Discharge   Follow-up Appointments     Follow-up and recommended labs and tests       Follow up with primary care provider, Manav James, within 7 days   to evaluate medication change, for hospital follow- up, and regarding new   diagnosis.  The following labs/tests are recommended: BMP, Mg. Follow with   your cardiologist as scheduled.            Unresulted Labs Ordered in the Past 30 Days of this Admission       No orders found from 7/14/2024 to 8/14/2024.            Discharge Disposition   Discharged to home  Condition at discharge: Stable    Hospital Course   Moo Orourke is a 69 year old male admitted on 8/13/2024. He presented with intermittent chest pressure which radiates to right arm for the last few days. He was transferred from [facility] with an inferior STEMI. PCI was done and REAGAN placed in mid RCA. Hospitalist service was consulted for admission and medical co-management.  Acute inferior STEMI  LV dysfunction  -- S/p REAGAN in mid RCA on 08/13  -- Per interventional cards: Recommend staged PCI of LAD/diagonal lesion during this index hospitalization given sluggish flow. The OM lesions can be staged during this hospitalization or in OP  -- TTE on 08/13: LVEF: 50-55%. Severe anterior, septal, and apical wall hypokinesis.   -- ASA, Brilinta (for 12 months), tfollowed by either Plavix or ASA monotherapy. Lisinopril and lopressor per cards.   -- Lisinopril switched to ibesartan due to cough per pt. "   -- S/p staged PCI on 08/14 with stenst to LD, D1, OM2, OM3.    -- Repeat TTE in 3 months per cards  Essential hypertension  -- ACEI/ARBs and lopressor per cards. Hydrochlorothiazide held to avoid volume depletion.   CKD-II  -- Avoid nephrotoxins  Hyperlipidemia  -- C/w Lipitor  Psoriasis  -- On Skyrizi  Distal quadriceps tendon rupture  -- S/p open repair on 07/12  Patient is clinically and hemodynamically stable for discharge. Medication reconciliation was done. Medications sent to patient's preferred pharmacy. All labs and radiologic findings discussed with patient. Follow up appointments and recommendations as shown below. Patient/family verbalized understanding and agreed to plan of care. All questions answered.     Consultations This Hospital Stay   CARDIOLOGY IP CONSULT  HOSPITALIST IP CONSULT  NUTRITION SERVICES ADULT IP CONSULT  CARDIAC REHAB IP CONSULT  PHARMACY LIAISON FOR MEDICATION COVERAGE CONSULT  NUTRITION SERVICES ADULT IP CONSULT  SMOKING CESSATION PROGRAM IP CONSULT    Code Status   Full Code    Time Spent on this Encounter   I, Jessica Sauceda MD, personally saw the patient today and spent greater than 30 minutes discharging this patient.       Jessica Sauceda MD  [facility] ICU [address] [address]  Phone: [phone]  Fax: [phone]  ______________________________________________________________________    Physical Exam   Vital Signs: Temp: 98.6  F (37  C) Temp src: Oral BP: 130/60 Pulse: 70   Resp: 18 SpO2: 97 % O2 Device: None (Room air)    Weight: 212 lbs 6.4 oz  GEN: Alert and oriented. Not in acute distress.  HEENT: Atraumatic, mucous membrane- moist and pink.  Chest: Bilateral air entry.  CVS: S1S2 regular.   Abdomen: Soft. Non-tender, non-distended. No organomegaly. No guarding or rigidity. Bowel sounds active.   Extremities: No pedal edema.  CNS: No involuntary movements.  Skin: no cyanosis or clubbing.        Primary Care  Physician   Physician No Ref-Primary    Discharge Orders      Lipid Profile    Schedule Lipid profile in 4 weeks     Cardiac Rehab  Referral      Ambulatory Cardiologist Referral      Follow-Up with Cardiology Ambulatory Heart Care [facility] Referral      Reason Lipid Lowering Medications not prescribed from this order set    Already ordered     Reason for your hospital stay    STEMI     Follow-up and recommended labs and tests     Follow up with primary care provider, Manav James, within 7 days to evaluate medication change, for hospital follow- up, and regarding new diagnosis.  The following labs/tests are recommended: BMP, Mg. Follow with your cardiologist as scheduled.     Activity    Your activity upon discharge: activity as tolerated     Discharge Instructions    Continue with ASA 81 mg tablet by mouth once a day indefinitely, ticagrelor 90 mg tablet twice a day by mouth for 12 months.     Diet    Follow this diet upon discharge: Orders Placed This Encounter      Low Saturated Fat Na <2400 mg       Significant Results and Procedures       Discharge Medications   Current Discharge Medication List        START taking these medications    Details   aspirin 81 MG EC tablet Take 1 tablet (81 mg) by mouth daily Start tomorrow.  Qty: 30 tablet, Refills: 3    Associated Diagnoses: Status post insertion of drug-eluting stent into left anterior descending (LAD) artery      atorvastatin (LIPITOR) 80 MG tablet Take 1 tablet (80 mg) by mouth daily  Qty: 30 tablet, Refills: 2    Associated Diagnoses: CAD, multiple vessel; Hyperlipidemia LDL goal <100      carvedilol (COREG) 6.25 MG tablet Take 1 tablet (6.25 mg) by mouth 2 times daily (with meals)  Qty: 60 tablet, Refills: 2    Associated Diagnoses: ST elevation myocardial infarction involving right coronary artery (H); Primary hypertension      irbesartan (AVAPRO) 150 MG tablet Take 1 tablet (150 mg) by mouth daily  Qty: 30 tablet, Refills: 2    Associated  Diagnoses: ST elevation myocardial infarction involving right coronary artery (H); Primary hypertension      ticagrelor (BRILINTA) 90 MG tablet Take 1 tablet (90 mg) by mouth every 12 hours  Qty: 60 tablet, Refills: 2    Associated Diagnoses: ST elevation myocardial infarction involving right coronary artery (H)           CONTINUE these medications which have NOT CHANGED    Details   coenzyme Q10 125 mg cap Take 125 mg by mouth daily      fish oil-omega-3 fatty acids 300-1,000 mg capsule [FISH OIL-OMEGA-3 FATTY ACIDS 300-1,000 MG CAPSULE] Take 2 g by mouth daily.      Magnesium Oxide 250 MG TABS Take 250 mg by mouth daily      Multiple Vitamins-Minerals (ICAPS AREDS 2 PO) Take 1 tablet by mouth daily      niacin 100 MG tablet Take 100 mg by mouth daily (with breakfast)      risankizumab-rzaa (SKYRIZI SUBQ) [RISANKIZUMAB-RZAA (SKYRIZI SUBQ)] Inject under the skin.      Turmeric 400 MG CAPS Take 400 mg by mouth daily      vitamin D3 (CHOLECALCIFEROL) 50 mcg (2000 units) tablet Take 2 tablets by mouth daily           STOP taking these medications       irbesartan-hydrochlorothiazide (AVALIDE) 150-12.5 MG tablet Comments:   Reason for Stopping:         multivitamin w/minerals (THERA-VIT-M) tablet Comments:   Reason for Stopping:             Allergies   No Known Allergies

## 2024-08-15 NOTE — PLAN OF CARE
[facility] - ICU    RN Progress Note:            Pertinent Assessments:      Please refer to flowsheet rows for full assessment     Vital signs stable,Radial site intact with no hematoma,no fever.denies pain.           Key Events - This Shift:       Uneventful                Barriers to Discharge / Downgrade:     None.

## 2024-08-15 NOTE — PROGRESS NOTES
[facility] HEART [facility]ARE   [address], [place] [address]   [email or web address]   OFFICE: [phone]     CARDIOLOGY INPATIENT PROGRESS NOTE     Impression and Plan     Assessment/Plan:  CAD s/p RCA STEMI 8/13   - Staged PCI done with stents to LAD, D1, OM2, OM3   - Cont DAPT with ASA 81 mg indefinitely, ticagrelor x 12 months.  Likely switch to plavix and continue indefinitely at 12 month vincent given stent burden.   - Cont BB and ARB   - .  Continue atorvastatin 80mg. Plan on repeat FLP in 2 months   - Recommend CR   - Lifestyle modification     LV dysfunction   - Plan on repeat TTE in 3 months     HTN   - Well controlled overall   - Continue current management     Ok to discharge today.  He has an appointment with Dr. Musa next month.    Subjective     Moo Orourke is feeling ok, reading lots of material and feeling a little overwhelmed.        Review of Systems:  Further review of systems is otherwise negative/noncontributory (based on review of medical record (admission H&P) and 13 point review of systems reviewed. Pertinent positives noted).    Cardiac Diagnostics     ECG: Personally reviewed and interpreted: 8/14/2024  NSR  Inferior infarct  Possible anterior infarct     Telemetry (personally reviewed):   NSR     Most recent:  Echocardiogram (results reviewed): 8/13/2024  Left ventricular function is decreased. The ejection fraction is 50-55%  (borderline).  There is severe anterior, septal, and apical wall hypokinesis.  Normal right ventricle size and systolic function.  No hemodynamically significant valvular abnormalities on 2D or color flow  imaging.     Cardiac Cath (results reviewed): 8/14/2024    1st Mrg lesion is 70% stenosed.    2nd Mrg lesion is 90% stenosed.    3rd Mrg lesion is 80% stenosed.    Mid LAD lesion is 99% stenosed.    1st Diag lesion is 85% stenosed.    Prox LAD lesion is 60% stenosed.     Successful PCI LAD/diagonal stenosis with  modified DK crush technique: 3.0 x 34 Julián REAGAN mid LAD, 3.5 x 12 Julián REAGAN proximal LAD, 2.5 x 18 Julián REAGAN diagonal.  0% residual all lesions, MICHEAL-3 flow.  Successful PCI LCx OM 2 with 2.25 x 32 Synergy REAGAN x 1.  0% residual MICHEAL-3 flow.  Successful PCI LCx OM 3 with 2.5 x 20 Synergy REAGAN x 1.  0% residual MICHEAL-3 flow  RCA stent remains widely patent.        Medical History  Surgical History Family History Social History   Past Medical History:   Diagnosis Date    Chronic cough     Facial basal cell cancer     Hypertension     Phimosis of penis     Psoriasis      Past Surgical History:   Procedure Laterality Date    CIRCUMCISION N/A 3/24/2020    Procedure: CIRCUMCISION;  Surgeon: Scott Maki MD;  Location: [facility];  Service: Urology    COLONOSCOPY      CV CORONARY ANGIOGRAM N/A 8/13/2024    Procedure: Coronary Angiogram;  Surgeon: Prasanna Arriaza MD;  Location: [facility]    CV CORONARY ANGIOGRAM N/A 8/14/2024    Procedure: Coronary Angiogram;  Surgeon: Pasha Musa MD;  Location: [facility] CV    CV INTRAVASULAR ULTRASOUND N/A 8/13/2024    Procedure: Intravascular Ultrasound;  Surgeon: Prasanna Arriaza MD;  Location: [facility]    CV PCI STENT DRUG ELUTING N/A 8/13/2024    Procedure: Percutaneous Coronary Intervention Stent;  Surgeon: Prasanna Arriaza MD;  Location: [facility]    CV PCI STENT DRUG ELUTING N/A 8/14/2024    Procedure: Percutaneous Coronary Intervention Stent;  Surgeon: Pasha Musa MD;  Location: [facility] CV    MOHS MICROGRAPHIC PROCEDURE Left 03/11/2020     No family history on file.        Social History     Socioeconomic History    Marital status:      Spouse name: Not on file    Number of children: Not on file    Years of education: Not on file    Highest education level: Not on file   Occupational History    Not on file   Tobacco Use    Smoking status: Never    Smokeless tobacco: Never   Substance and Sexual  "Activity    Alcohol use: Never    Drug use: Never    Sexual activity: Not on file   Other Topics Concern    Not on file   Social History Narrative    Not on file     Social Determinants of Health     Financial Resource Strain: Not on file   Food Insecurity: Not on file   Transportation Needs: Not on file   Physical Activity: Not on file   Stress: Not on file   Social Connections: Not on file   Interpersonal Safety: Not on file   Housing Stability: Not on file             Physical Examination   VITALS: BP (!) 155/79   Pulse 75   Temp 98.6  F (37  C) (Oral)   Resp 18   Ht 1.702 m (5' 7\")   Wt 96.3 kg (212 lb 6.4 oz)   SpO2 98%   BMI 33.27 kg/m    BMI: Body mass index is 33.27 kg/m .  Wt Readings from Last 3 Encounters:   08/14/24 96.3 kg (212 lb 6.4 oz)   08/13/24 98 kg (216 lb)   03/20/20 93.9 kg (207 lb)       Intake/Output Summary (Last 24 hours) at 8/15/2024 1034  Last data filed at 8/15/2024 0916  Gross per 24 hour   Intake 240 ml   Output 1100 ml   Net -860 ml       General: pleasant male. No acute distress.   HENT: external ears normal. Nares patent. Mucous membranes moist.  Neck: No JVD  Lungs: clear to auscultation  COR:  regular rate and rhythm, No murmurs, rubs, or gallops  Abd: nondistended, BS present  Extrem: No edema              Lab Results Reviewed    Chemistry/lipid CBC Cardiac Enzymes/BNP/TSH/INR   Recent Labs   Lab Test 08/13/24  0702   CHOL 171   HDL 37*   *   TRIG 49     Recent Labs   Lab Test 08/13/24  0702 12/29/23  0927 02/17/23  0922   * 154* 134*     Recent Labs   Lab Test 08/15/24  0402      POTASSIUM 4.2   CHLORIDE 103   CO2 25   *   BUN 20.2   CR 0.87   GFRESTIMATED >90   ROMINA 8.5*     Recent Labs   Lab Test 08/15/24  0402 08/14/24  0440 08/13/24  0432   CR 0.87 0.89 1.02     Recent Labs   Lab Test 12/29/23  0927   A1C 6.1*          Recent Labs   Lab Test 08/14/24  0440   WBC 8.7   HGB 13.5   HCT 39.6*   MCV 88        Recent Labs   Lab Test " "08/14/24  0440 08/13/24  0432 12/29/23  0927   HGB 13.5 15.9 15.6    No results for input(s): \"TROPONINI\" in the last 46168 hours.  No results for input(s): \"BNP\", \"NTBNPI\", \"NTBNP\" in the last 55151 hours.  No results for input(s): \"TSH\" in the last 21725 hours.  Recent Labs   Lab Test 08/13/24 0432   INR 1.11           Current Inpatient Scheduled Medications   Scheduled Meds:  Current Facility-Administered Medications   Medication Dose Route Frequency Provider Last Rate Last Admin    aspirin EC tablet 81 mg  81 mg Oral Daily Leifamitsiomoreno Adwoa C, CNP   81 mg at 08/15/24 0828    atorvastatin (LIPITOR) tablet 80 mg  80 mg Oral Daily Kalamitsiotis, Adwoa C, CNP   80 mg at 08/15/24 0828    carvedilol (COREG) tablet 6.25 mg  6.25 mg Oral BID w/meals Kalamitsiotis, Adwoa C, CNP   6.25 mg at 08/15/24 0828    [Held by provider] hydroCHLOROthiazide tablet 12.5 mg  12.5 mg Oral Daily Kalamitsiotis, Adwoa C, CNP        irbesartan (AVAPRO) tablet 150 mg  150 mg Oral Daily Kalamitsiotis, Adwoa C, CNP   150 mg at 08/15/24 0828    magnesium oxide (MAG-OX) half-tab 200 mg  200 mg Oral Daily Kalamitsiotis, Adwoa C, CNP   200 mg at 08/15/24 0828    ticagrelor (BRILINTA) tablet 90 mg  90 mg Oral Q12H [facility] (08/20) Kalamitsiotis, Adwoa C, CNP   90 mg at 08/15/24 0647     Continuous Infusions:  Current Facility-Administered Medications   Medication Dose Route Frequency Provider Last Rate Last Admin    Continuing antiplatelet from home medication list OR antiplatelet order already placed during this visit   Does not apply DOES NOT GO TO Pasha Bains MD        Continuing beta blocker from home medication list OR beta blocker order already placed during this visit   Does not apply DOES NOT GO TO Pasha Bains MD        Continuing statin from home medication list OR statin order already placed during this visit   Does not apply DOES NOT GO TO Pasha Bains MD        Percutaneous Coronary Intervention " orders placed (this is information for BPA alerting)   Does not apply DOES NOT GO TO Adwoa Ashton CNP        Percutaneous Coronary Intervention orders placed (this is information for BPA alerting)   Does not apply DOES NOT GO TO Adwoa Ashton CNP        reason aspirin not prescribed (intentional)   Other DOES NOT GO TO Pasha Bains MD           Current Outpatient Medications   Medication Sig Dispense Refill    aspirin 81 MG EC tablet Take 1 tablet (81 mg) by mouth daily Start tomorrow. 30 tablet 3    aspirin 81 MG EC tablet Take 1 tablet (81 mg) by mouth daily Start tomorrow. 30 tablet 3    atorvastatin (LIPITOR) 40 MG tablet Take 1 tablet (40 mg) by mouth daily 90 tablet 3          Medications Prior to Admission   Prior to Admission medications    Medication Sig Start Date End Date Taking? Authorizing Provider   aspirin 81 MG EC tablet Take 1 tablet (81 mg) by mouth daily Start tomorrow. 8/15/24  Yes Pasha Musa MD   aspirin 81 MG EC tablet Take 1 tablet (81 mg) by mouth daily Start tomorrow. 8/14/24  Yes Prasanna Arriaza MD   atorvastatin (LIPITOR) 40 MG tablet Take 1 tablet (40 mg) by mouth daily 8/13/24  Yes Prasanna Arriaza MD   coenzyme Q10 125 mg cap Take 125 mg by mouth daily 3/20/20  Yes Provider, Historical   fish oil-omega-3 fatty acids 300-1,000 mg capsule [FISH OIL-OMEGA-3 FATTY ACIDS 300-1,000 MG CAPSULE] Take 2 g by mouth daily. 3/20/20  Yes Provider, Historical   irbesartan-hydrochlorothiazide (AVALIDE) 150-12.5 MG tablet Take 1 tablet by mouth daily   Yes Unknown, Entered By History   Magnesium Oxide 250 MG TABS Take 250 mg by mouth daily   Yes Unknown, Entered By History   Multiple Vitamins-Minerals (ICAPS AREDS 2 PO) Take 1 tablet by mouth daily   Yes Unknown, Entered By History   multivitamin w/minerals (THERA-VIT-M) tablet Take 1 tablet by mouth daily   Yes Unknown, Entered By History   niacin 100 MG tablet Take 100 mg by mouth daily (with  breakfast)   Yes Unknown, Entered By History   risankizumab-rzaa (SKYRIZI SUBQ) [RISANKIZUMAB-RZAA (SKYRIZI SUBQ)] Inject under the skin. 3/20/20  Yes Provider, Historical   Turmeric 400 MG CAPS Take 400 mg by mouth daily   Yes Unknown, Entered By History   vitamin D3 (CHOLECALCIFEROL) 50 mcg (2000 units) tablet Take 2 tablets by mouth daily   Yes Unknown, Entered By History          Terry King DO [facility]  Non-invasive Cardiologist  [facility]

## 2024-08-15 NOTE — PLAN OF CARE
Occupational Therapy Discharge Summary    Reason for therapy discharge:    Discharged to home with outpatient therapy.    Progress towards therapy goal(s). See goals on Care Plan in [facility] electronic health record for goal details.  Goals met    Therapy recommendation(s):    Continued therapy is recommended.  Rationale/Recommendations:  Recommend outpatient cardiac rehab.

## 2024-08-17 ENCOUNTER — PATIENT OUTREACH (OUTPATIENT)
Dept: CARE COORDINATION | Facility: CLINIC | Age: 69
End: 2024-08-17
Payer: COMMERCIAL

## 2024-08-17 NOTE — PROGRESS NOTES
Connected Care Resource Center:   [facility] Resource Center Contact  RUST/Voicemail     Clinical Data: Post-Discharge Outreach     Outreach attempted x 2.  Left message on patient's voicemail, providing [facility]'s central phone number of [phone] ([phone]) for questions/concerns and/or to schedule an appt with an [facility] provider, if they do not have a PCP.      Plan:  [facility] will do no further outreaches at this time.       Michelle Lawson MA  Connected Care Resource Center, [facility]    *Connected Care Resource Team does NOT follow patient ongoing. Referrals are identified based on internal discharge reports and the outreach is to ensure patient has an understanding of their discharge instructions.

## 2024-08-21 ENCOUNTER — TRANSFERRED RECORDS (OUTPATIENT)
Dept: HEALTH INFORMATION MANAGEMENT | Facility: CLINIC | Age: 69
End: 2024-08-21

## 2024-08-21 ENCOUNTER — LAB REQUISITION (OUTPATIENT)
Dept: LAB | Facility: CLINIC | Age: 69
End: 2024-08-21
Payer: COMMERCIAL

## 2024-08-21 DIAGNOSIS — I10 ESSENTIAL (PRIMARY) HYPERTENSION: ICD-10-CM

## 2024-08-21 PROCEDURE — 80048 BASIC METABOLIC PNL TOTAL CA: CPT | Mod: ORL | Performed by: FAMILY MEDICINE

## 2024-08-21 PROCEDURE — 83735 ASSAY OF MAGNESIUM: CPT | Mod: ORL | Performed by: FAMILY MEDICINE

## 2024-08-21 NOTE — PROGRESS NOTES
Assessment/Recommendations   Assessment:    1.  Coronary artery disease: Recent RCA STEMI with PCI to RCA receiving REAGAN x 1 8/13/2024.  Staged PCI 8/14/2024 with REAGAN x 1 to OM 2, REAGAN x 1 to OM 3, REAGAN x 3 to LAD/diagonal.     - On dual antiplatelet therapy with ASA 81 mg indefinitely and Ticagrelor (Brilinta) 90 mg twice a day for 12 months.  Likely switch to Plavix at 12 months to continue indefinitely given stent burden.  Patient denies any anginal symptoms.   - Cardiac rehab has been scheduled  - Reviewed most recent BMP, Hgb, platelet- stable.    2.  Dyslipidemia with LDL goal <70/Obesity with a BMI of 33.05: Moo Orourke is on high intensity statin therapy with atorvastatin 80 mg daily. Most recent LDL is 124.      3.  Hypertension: His blood pressure is controlled 132/72. Currently on irbesartan 150 mg daily, carvedilol 6.25 mg twice daily      Plan:  - We discussed the importance of antiplatelet therapy and talking with his cardiologist prior to stopping these medications for any reason.  We discussed about utilization of as needed nitroglycerin.   - Encouraged to seek medical attention if recurrent chest pain or shortness of breath.    - Repeat echocardiogram in 3 months to reassess EF    - Continue current hypertension regimen    - Continue hyperlipidemia regimen. Fasting lipid profile check in 4-8 weeks.     - Cardiac rehab as scheduled    - We discussed a diet low in saturated fat, weight loss, and exercise along with medication for better control of cholesterol.  Highly encouraged to participate in nutrition class in cardiac rehab.    - Risk factor modification and lifestyle management topics were discussed including managing comorbidities, weight loss, heart healthy diet, exercise, and stress reduction.        Follow up with Dr. Musa as scheduled 9/30/2024     History of Present Illness/Subjective    Mr. Moo Orourke is a 69 year old male with a past medical history of hypertension who is seen  at [facility] for post coronary intervention follow up.  Patient had presented to the ED 8/13/2024 with chest pain, diaphoresis, and dizziness.  EKG showed ST segment elevation in 2, 3 and aVF and depression in 1, aVL and V2.  Troponin was elevated at 128.  STEMI code was activated and patient went to Cath Lab receiving REAGAN x 1 to mid RCA.  There was residual stenosis in the mid LAD and first diagonal and high-grade stenosis in the second and third obtuse marginal branches.  The next day patient underwent staged PCI receiving REAGAN x 1 to the left circumflex OM 2 and REAGAN x 1 to left circumflex OM 3 as well as REAGAN x 3 to LAD/diagonal. Most recent ECHO showed an EF of 50 to 55%.     Patient notes he has been doing well from a cardiac standpoint since his stent placement.  Denies any recurrence of the symptoms that brought him to the ED.  Patient is dealing with an Ortho injury/surgery so his walking is limited.  We discussed many diet related questions and limiting saturated fats and sodium.  He denies fatigue, lightheadedness, shortness of breath, dyspnea on exertion, orthopnea, PND, palpitations, chest pain, abdominal fullness/bloating, and lower extremity edema.        Coronary Angiogram 8/14/24 reviewed:    1st Mrg lesion is 70% stenosed.    2nd Mrg lesion is 90% stenosed.    3rd Mrg lesion is 80% stenosed.    Mid LAD lesion is 99% stenosed.    1st Diag lesion is 85% stenosed.    Prox LAD lesion is 60% stenosed.     Successful PCI LAD/diagonal stenosis with modified DK crush technique: 3.0 x 34 [place] REAGAN mid LAD, 3.5 x 12 Julián REAGAN proximal LAD, 2.5 x 18 [place] REAGAN diagonal.  0% residual all lesions, MICHEAL-3 flow.  Successful PCI LCx OM 2 with 2.25 x 32 Synergy REAGAN x 1.  0% residual MICHEAL-3 flow.  Successful PCI LCx OM 3 with 2.5 x 20 Synergy REAGAN x 1.  0% residual MICHEAL-3 flow  RCA stent remains widely patent.    Coronary angiogram 8/13/2024:  Acute inferior ST elevation MI status post successful  PCI with single drug-eluting stent placement (3.5 x 20, postdilated to 4.25) in the mid RCA  Residual complex stenosis involving mid LAD and first diagonal  Residual high-grade stenosis involving the second and third obtuse marginal branches      ECHO 8/13/2024 reviewed:   Left ventricular function is decreased. The ejection fraction is 50-55%  (borderline).  There is severe anterior, septal, and apical wall hypokinesis.  Normal right ventricle size and systolic function.  No hemodynamically significant valvular abnormalities on 2D or color flow  imaging.         Physical Examination Review of Systems   BP (!) 149/83 (BP Location: Left arm, Patient Position: Sitting, Cuff Size: Adult Large)   Pulse 64   Resp 18   Wt 95.7 kg (211 lb)   SpO2 98%   BMI 33.05 kg/m    Body mass index is 33.05 kg/m .  Wt Readings from Last 3 Encounters:   08/22/24 95.7 kg (211 lb)   08/14/24 96.3 kg (212 lb 6.4 oz)   08/13/24 98 kg (216 lb)     General Appearance:   no distress, normal body habitus   ENT/Mouth: membranes moist, no oral lesions or bleeding gums.      EYES:  no scleral icterus, normal conjunctivae       Chest/Lungs:   lungs are clear to auscultation, no rales or wheezing, equal chest wall expansion    Cardiovascular:   Regular. Normal first and second heart sounds with no murmurs, rubs, or gallops; the carotid, radial and posterior tibial pulses are intact, Jugular venous pressure normal, no edema bilaterally        Extremities  Puncture Site: no cyanosis or clubbing  Right radial site is soft with mild bruising.  Radial pulses and Pedal pulses intact and symmetrical.  CMS intact.   Skin: no xanthelasma, warm.    Neurologic: normal  bilateral, no tremors     Psychiatric: alert and oriented x3, calm                                                        Negative unless noted in HPI     Medical History  Surgical History Family History Social History   Past Medical History:   Diagnosis Date    Chronic cough     Facial  basal cell cancer     Hypertension     Phimosis of penis     Psoriasis     Past Surgical History:   Procedure Laterality Date    CIRCUMCISION N/A 3/24/2020    Procedure: CIRCUMCISION;  Surgeon: Scott Maki MD;  Location: [facility];  Service: Urology    COLONOSCOPY      CV CORONARY ANGIOGRAM N/A 8/13/2024    Procedure: Coronary Angiogram;  Surgeon: Prasanna Arriaza MD;  Location: [facility] LAB CV    CV CORONARY ANGIOGRAM N/A 8/14/2024    Procedure: Coronary Angiogram;  Surgeon: Pasha Musa MD;  Location: [facility] CV    CV INTRAVASULAR ULTRASOUND N/A 8/13/2024    Procedure: Intravascular Ultrasound;  Surgeon: Prasanna Arriaza MD;  Location: [facility]    CV PCI STENT DRUG ELUTING N/A 8/13/2024    Procedure: Percutaneous Coronary Intervention Stent;  Surgeon: Prasanna Arriaza MD;  Location: [facility] CV    CV PCI STENT DRUG ELUTING N/A 8/14/2024    Procedure: Percutaneous Coronary Intervention Stent;  Surgeon: Pasha Musa MD;  Location: [facility] CV    MOHS MICROGRAPHIC PROCEDURE Left 03/11/2020    No family history on file. Social History     Socioeconomic History    Marital status:      Spouse name: Not on file    Number of children: Not on file    Years of education: Not on file    Highest education level: Not on file   Occupational History    Not on file   Tobacco Use    Smoking status: Never    Smokeless tobacco: Never   Substance and Sexual Activity    Alcohol use: Never    Drug use: Never    Sexual activity: Not on file   Other Topics Concern    Not on file   Social History Narrative    Not on file     Social Determinants of Health     Financial Resource Strain: Not on file   Food Insecurity: Not on file   Transportation Needs: Not on file   Physical Activity: Not on file   Stress: Not on file   Social Connections: Not on file   Interpersonal Safety: Not on file   Housing Stability: Not on file          Medications  Allergies    Current Outpatient Medications   Medication Sig Dispense Refill    aspirin 81 MG EC tablet Take 1 tablet (81 mg) by mouth daily Start tomorrow. 30 tablet 3    atorvastatin (LIPITOR) 80 MG tablet Take 1 tablet (80 mg) by mouth daily 30 tablet 2    carvedilol (COREG) 6.25 MG tablet Take 1 tablet (6.25 mg) by mouth 2 times daily (with meals) 60 tablet 2    coenzyme Q10 125 mg cap Take 125 mg by mouth daily      fish oil-omega-3 fatty acids 300-1,000 mg capsule [FISH OIL-OMEGA-3 FATTY ACIDS 300-1,000 MG CAPSULE] Take 2 g by mouth daily.      irbesartan (AVAPRO) 150 MG tablet Take 1 tablet (150 mg) by mouth daily 30 tablet 2    Magnesium Oxide 250 MG TABS Take 250 mg by mouth daily      Multiple Vitamins-Minerals (ICAPS AREDS 2 PO) Take 1 tablet by mouth daily      niacin 100 MG tablet Take 100 mg by mouth daily (with breakfast)      risankizumab-rzaa (SKYRIZI SUBQ) [RISANKIZUMAB-RZAA (SKYRIZI SUBQ)] Inject under the skin.      ticagrelor (BRILINTA) 90 MG tablet Take 1 tablet (90 mg) by mouth every 12 hours 60 tablet 2    Turmeric 400 MG CAPS Take 400 mg by mouth daily      vitamin D3 (CHOLECALCIFEROL) 50 mcg (2000 units) tablet Take 2 tablets by mouth daily      No Known Allergies      Lab Results    Chemistry/lipid CBC Cardiac Enzymes/BNP/TSH/INR   Lab Results   Component Value Date    CHOL 171 08/13/2024    HDL 37 (L) 08/13/2024    TRIG 49 08/13/2024    BUN 20.2 08/15/2024     08/15/2024    CO2 25 08/15/2024    Lab Results   Component Value Date    WBC 8.7 08/14/2024    HGB 13.5 08/14/2024    HCT 39.6 (L) 08/14/2024    MCV 88 08/14/2024     08/14/2024    Lab Results   Component Value Date    INR 1.11 08/13/2024            This note has been dictated using voice recognition software. Any grammatical, typographical, or context distortions are unintentional and inherent to the software    Su Garcia PA-C

## 2024-08-22 ENCOUNTER — OFFICE VISIT (OUTPATIENT)
Dept: CARDIOLOGY | Facility: CLINIC | Age: 69
End: 2024-08-22
Attending: INTERNAL MEDICINE
Payer: COMMERCIAL

## 2024-08-22 VITALS
SYSTOLIC BLOOD PRESSURE: 132 MMHG | RESPIRATION RATE: 18 BRPM | OXYGEN SATURATION: 98 % | DIASTOLIC BLOOD PRESSURE: 72 MMHG | WEIGHT: 211 LBS | BODY MASS INDEX: 33.05 KG/M2 | HEART RATE: 64 BPM

## 2024-08-22 DIAGNOSIS — E78.5 DYSLIPIDEMIA: ICD-10-CM

## 2024-08-22 DIAGNOSIS — I21.11 ST ELEVATION MYOCARDIAL INFARCTION INVOLVING RIGHT CORONARY ARTERY (H): ICD-10-CM

## 2024-08-22 DIAGNOSIS — I10 BENIGN ESSENTIAL HYPERTENSION: ICD-10-CM

## 2024-08-22 DIAGNOSIS — I25.10 CORONARY ARTERY DISEASE INVOLVING NATIVE CORONARY ARTERY OF NATIVE HEART WITHOUT ANGINA PECTORIS: Primary | ICD-10-CM

## 2024-08-22 LAB
ANION GAP SERPL CALCULATED.3IONS-SCNC: 17 MMOL/L (ref 7–15)
BUN SERPL-MCNC: 20.8 MG/DL (ref 8–23)
CALCIUM SERPL-MCNC: 9 MG/DL (ref 8.8–10.4)
CHLORIDE SERPL-SCNC: 104 MMOL/L (ref 98–107)
CREAT SERPL-MCNC: 0.91 MG/DL (ref 0.67–1.17)
EGFRCR SERPLBLD CKD-EPI 2021: >90 ML/MIN/1.73M2
GLUCOSE SERPL-MCNC: 99 MG/DL (ref 70–99)
HCO3 SERPL-SCNC: 16 MMOL/L (ref 22–29)
MAGNESIUM SERPL-MCNC: 2.1 MG/DL (ref 1.7–2.3)
POTASSIUM SERPL-SCNC: 4.1 MMOL/L (ref 3.4–5.3)
SODIUM SERPL-SCNC: 137 MMOL/L (ref 135–145)

## 2024-08-22 PROCEDURE — 99214 OFFICE O/P EST MOD 30 MIN: CPT | Performed by: STUDENT IN AN ORGANIZED HEALTH CARE EDUCATION/TRAINING PROGRAM

## 2024-08-22 NOTE — LETTER
8/22/2024    Physician No Ref-Primary  No address on file    RE: Moo Orourke       Dear Colleague,     I had the pleasure of seeing Moo [facility]ke in the [facility].          Assessment/Recommendations   Assessment:    1.  Coronary artery disease: Recent RCA STEMI with PCI to RCA receiving REAGAN x 1 8/13/2024.  Staged PCI 8/14/2024 with REAGAN x 1 to OM 2, REAGAN x 1 to OM 3, REAGAN x 3 to LAD/diagonal.     - On dual antiplatelet therapy with ASA 81 mg indefinitely and Ticagrelor (Brilinta) 90 mg twice a day for 12 months.  Likely switch to Plavix at 12 months to continue indefinitely given stent burden.  Patient denies any anginal symptoms.   - Cardiac rehab has been scheduled  - Reviewed most recent BMP, Hgb, platelet- stable.    2.  Dyslipidemia with LDL goal <70/Obesity with a BMI of 33.05: Moo Orourke is on high intensity statin therapy with atorvastatin 80 mg daily. Most recent LDL is 124.      3.  Hypertension: His blood pressure is controlled 132/72. Currently on irbesartan 150 mg daily, carvedilol 6.25 mg twice daily      Plan:  - We discussed the importance of antiplatelet therapy and talking with his cardiologist prior to stopping these medications for any reason.  We discussed about utilization of as needed nitroglycerin.   - Encouraged to seek medical attention if recurrent chest pain or shortness of breath.    - Repeat echocardiogram in 3 months to reassess EF    - Continue current hypertension regimen    - Continue hyperlipidemia regimen. Fasting lipid profile check in 4-8 weeks.     - Cardiac rehab as scheduled    - We discussed a diet low in saturated fat, weight loss, and exercise along with medication for better control of cholesterol.  Highly encouraged to participate in nutrition class in cardiac rehab.    - Risk factor modification and lifestyle management topics were discussed including managing comorbidities, weight loss, heart healthy diet, exercise, and stress reduction.         Follow up with Dr. Musa as scheduled 9/30/2024     History of Present Illness/Subjective    Mr. Moo Orourke is a 69 year old male with a past medical history of hypertension who is seen at [facility] for post coronary intervention follow up.  Patient had presented to the ED 8/13/2024 with chest pain, diaphoresis, and dizziness.  EKG showed ST segment elevation in 2, 3 and aVF and depression in 1, aVL and V2.  Troponin was elevated at 128.  STEMI code was activated and patient went to Cath Lab receiving REAGAN x 1 to mid RCA.  There was residual stenosis in the mid LAD and first diagonal and high-grade stenosis in the second and third obtuse marginal branches.  The next day patient underwent staged PCI receiving REAGAN x 1 to the left circumflex OM 2 and REAGAN x 1 to left circumflex OM 3 as well as REAGAN x 3 to LAD/diagonal. Most recent ECHO showed an EF of 50 to 55%.     Patient notes he has been doing well from a cardiac standpoint since his stent placement.  Denies any recurrence of the symptoms that brought him to the ED.  Patient is dealing with an Ortho injury/surgery so his walking is limited.  We discussed many diet related questions and limiting saturated fats and sodium.  He denies fatigue, lightheadedness, shortness of breath, dyspnea on exertion, orthopnea, PND, palpitations, chest pain, abdominal fullness/bloating, and lower extremity edema.        Coronary Angiogram 8/14/24 reviewed:     1st Mrg lesion is 70% stenosed.     2nd Mrg lesion is 90% stenosed.     3rd Mrg lesion is 80% stenosed.     Mid LAD lesion is 99% stenosed.     1st Diag lesion is 85% stenosed.     Prox LAD lesion is 60% stenosed.     Successful PCI LAD/diagonal stenosis with modified DK crush technique: 3.0 x 34 [place] REAGAN mid LAD, 3.5 x 12 [place] REAGAN proximal LAD, 2.5 x 18 [place] REAGAN diagonal.  0% residual all lesions, MICHEAL-3 flow.  Successful PCI LCx OM 2 with 2.25 x 32 [employer] REAGAN x 1.  0% residual MICHEAL-3  flow.  Successful PCI LCx OM 3 with 2.5 x 20 Synergy REAGAN x 1.  0% residual MICHEAL-3 flow  RCA stent remains widely patent.    Coronary angiogram 8/13/2024:  Acute inferior ST elevation MI status post successful PCI with single drug-eluting stent placement (3.5 x 20, postdilated to 4.25) in the mid RCA  Residual complex stenosis involving mid LAD and first diagonal  Residual high-grade stenosis involving the second and third obtuse marginal branches      ECHO 8/13/2024 reviewed:   Left ventricular function is decreased. The ejection fraction is 50-55%  (borderline).  There is severe anterior, septal, and apical wall hypokinesis.  Normal right ventricle size and systolic function.  No hemodynamically significant valvular abnormalities on 2D or color flow  imaging.         Physical Examination Review of Systems   BP (!) 149/83 (BP Location: Left arm, Patient Position: Sitting, Cuff Size: Adult Large)   Pulse 64   Resp 18   Wt 95.7 kg (211 lb)   SpO2 98%   BMI 33.05 kg/m    Body mass index is 33.05 kg/m .  Wt Readings from Last 3 Encounters:   08/22/24 95.7 kg (211 lb)   08/14/24 96.3 kg (212 lb 6.4 oz)   08/13/24 98 kg (216 lb)     General Appearance:   no distress, normal body habitus   ENT/Mouth: membranes moist, no oral lesions or bleeding gums.      EYES:  no scleral icterus, normal conjunctivae       Chest/Lungs:   lungs are clear to auscultation, no rales or wheezing, equal chest wall expansion    Cardiovascular:   Regular. Normal first and second heart sounds with no murmurs, rubs, or gallops; the carotid, radial and posterior tibial pulses are intact, Jugular venous pressure normal, no edema bilaterally        Extremities  Puncture Site: no cyanosis or clubbing  Right radial site is soft with mild bruising.  Radial pulses and Pedal pulses intact and symmetrical.  CMS intact.   Skin: no xanthelasma, warm.    Neurologic: normal  bilateral, no tremors     Psychiatric: alert and oriented x3, calm                                                         Negative unless noted in HPI     Medical History  Surgical History Family History Social History   Past Medical History:   Diagnosis Date     Chronic cough      Facial basal cell cancer      Hypertension      Phimosis of penis      Psoriasis     Past Surgical History:   Procedure Laterality Date     CIRCUMCISION N/A 3/24/2020    Procedure: CIRCUMCISION;  Surgeon: Scott Maki MD;  Location: [facility];  Service: Urology     COLONOSCOPY       CV CORONARY ANGIOGRAM N/A 8/13/2024    Procedure: Coronary Angiogram;  Surgeon: Prasanna Arriaza MD;  Location: [facility] CV     CV CORONARY ANGIOGRAM N/A 8/14/2024    Procedure: Coronary Angiogram;  Surgeon: Pasha Musa MD;  Location: [facility] CV     CV INTRAVASULAR ULTRASOUND N/A 8/13/2024    Procedure: Intravascular Ultrasound;  Surgeon: Prasanna Arriaza MD;  Location: [facility] CV     CV PCI STENT DRUG ELUTING N/A 8/13/2024    Procedure: Percutaneous Coronary Intervention Stent;  Surgeon: Prasanna Arriaza MD;  Location: [facility]     CV PCI STENT DRUG ELUTING N/A 8/14/2024    Procedure: Percutaneous Coronary Intervention Stent;  Surgeon: Pasha Musa MD;  Location: [facility] CV     MOHS MICROGRAPHIC PROCEDURE Left 03/11/2020    No family history on file. Social History     Socioeconomic History     Marital status:      Spouse name: Not on file     Number of children: Not on file     Years of education: Not on file     Highest education level: Not on file   Occupational History     Not on file   Tobacco Use     Smoking status: Never     Smokeless tobacco: Never   Substance and Sexual Activity     Alcohol use: Never     Drug use: Never     Sexual activity: Not on file   Other Topics Concern     Not on file   Social History Narrative     Not on file     Social Determinants of Health     Financial Resource Strain: Not on file   Food Insecurity: Not on file    Transportation Needs: Not on file   Physical Activity: Not on file   Stress: Not on file   Social Connections: Not on file   Interpersonal Safety: Not on file   Housing Stability: Not on file          Medications  Allergies   Current Outpatient Medications   Medication Sig Dispense Refill     aspirin 81 MG EC tablet Take 1 tablet (81 mg) by mouth daily Start tomorrow. 30 tablet 3     atorvastatin (LIPITOR) 80 MG tablet Take 1 tablet (80 mg) by mouth daily 30 tablet 2     carvedilol (COREG) 6.25 MG tablet Take 1 tablet (6.25 mg) by mouth 2 times daily (with meals) 60 tablet 2     coenzyme Q10 125 mg cap Take 125 mg by mouth daily       fish oil-omega-3 fatty acids 300-1,000 mg capsule [FISH OIL-OMEGA-3 FATTY ACIDS 300-1,000 MG CAPSULE] Take 2 g by mouth daily.       irbesartan (AVAPRO) 150 MG tablet Take 1 tablet (150 mg) by mouth daily 30 tablet 2     Magnesium Oxide 250 MG TABS Take 250 mg by mouth daily       Multiple Vitamins-Minerals (ICAPS AREDS 2 PO) Take 1 tablet by mouth daily       niacin 100 MG tablet Take 100 mg by mouth daily (with breakfast)       risankizumab-rzaa (SKYRIZI SUBQ) [RISANKIZUMAB-RZAA (SKYRIZI SUBQ)] Inject under the skin.       ticagrelor (BRILINTA) 90 MG tablet Take 1 tablet (90 mg) by mouth every 12 hours 60 tablet 2     Turmeric 400 MG CAPS Take 400 mg by mouth daily       vitamin D3 (CHOLECALCIFEROL) 50 mcg (2000 units) tablet Take 2 tablets by mouth daily      No Known Allergies      Lab Results    Chemistry/lipid CBC Cardiac Enzymes/BNP/TSH/INR   Lab Results   Component Value Date    CHOL 171 08/13/2024    HDL 37 (L) 08/13/2024    TRIG 49 08/13/2024    BUN 20.2 08/15/2024     08/15/2024    CO2 25 08/15/2024    Lab Results   Component Value Date    WBC 8.7 08/14/2024    HGB 13.5 08/14/2024    HCT 39.6 (L) 08/14/2024    MCV 88 08/14/2024     08/14/2024    Lab Results   Component Value Date    INR 1.11 08/13/2024            This note has been dictated using voice  recognition software. Any grammatical, typographical, or context distortions are unintentional and inherent to the software    Su Garcia PA-C      Thank you for allowing me to participate in the care of your patient.      Sincerely,     Su Garcia PA-C     [facility]  cc:   Prasanna Arriaza MD  [address]  [place] [address]

## 2024-08-22 NOTE — PATIENT INSTRUCTIONS
Moo Orourke,    It was a pleasure to see you today at the [facility].     My recommendations after this visit include:    - No medications changes made today    - Please seek medical attention if you develop recurrent chest pain or shortness of breath or similar symptoms you experienced prior to recent cardiac event    - Please get your lipid level test as scheduled. Make sure to fast for 8-12 hours prior to lab work. Okay to have plain water, black coffee or tea without creamer and sugar    - Schedule echocardiogram in 3 months    - Cardiac rehab as scheduled    - Follow up with Dr. Musa as scheduled 9/30/24    - Please call [phone], if you have any questions or concerns    Su Garcia PA-C    Medication     Take all your medications as prescribed  Do not stop any medications without talking with a healthcare provider    Exercise      Physical activity is important for overall health  Set a goal of 150 minutes of exercise each week  For example, 30 minutes of exercise 5 days each week.    These 30 minutes can be broken into shorter periods of 15 minutes twice daily or 10 minutes three times daily  Start any exercise program slowly and work towards the goal of 150 minutes each week  For example, you may start with 10 minutes and plan to add a few minutes each week as you get stronger   Examples of exercise include walking, swimming, or biking  Remember to stretch and stay hydrated with exercise    Diet     A heart healthy diet includes:  A variety of fruits and vegetables  Whole grains  Low-fat dairy (fat-free, 1% fat, and low-fat)  Lean meats and poultry without skin   Fish (eat fish 2 times each week)  Nuts  Limit saturated fat to about 13 grams each day (based on a 2000 calorie diet)  Limit red meat  Limit sugars (sweets and sugary beverages)  Limit your portion sizes  Do not add salt to your food when cooking or at the table  Limit alcohol intake (no more than 1 drink each day  for women or 2 drinks each day for men)    Weight Loss     Work on losing weight with diet and exercise  You BMI (body mass index) should be between 18.5-24.9  This is a calculation of your weight and height  Please ask your healthcare provider for your BMI    Manage Other Chronic Health Conditions     Control cholesterol  Eat a diet low in saturated fat  Exercise   Take a statin medication as prescribed  Manage blood pressure  Eat a diet low in sodium  Exercise  Reduce stress  Lose weight   Take blood pressure medications as prescribed  Control blood sugars if diabetic  Monitor sugars and carbohydrates in your diet  Lose weight   Take diabetes medications as prescribed  Follow-up with your primary care provider to make sure your blood sugars are well controlled    Stress Reduction     Find time each day to relax  Reading, listening to music, yoga, meditation, exercise, spending time with friends and family, volunteering   Get 6-8 hours of sleep each night      Information from the American Heart Association.  Please visit their website at www.heart.org

## 2024-09-05 ENCOUNTER — HOSPITAL ENCOUNTER (OUTPATIENT)
Dept: CARDIAC REHAB | Facility: CLINIC | Age: 69
Discharge: HOME | End: 2024-09-05
Attending: INTERNAL MEDICINE
Payer: COMMERCIAL

## 2024-09-05 DIAGNOSIS — I21.11 ST ELEVATION MYOCARDIAL INFARCTION INVOLVING RIGHT CORONARY ARTERY (H): ICD-10-CM

## 2024-09-05 PROCEDURE — 93798 PHYS/QHP OP CAR RHAB W/ECG: CPT

## 2024-09-05 PROCEDURE — 93797 PHYS/QHP OP CAR RHAB WO ECG: CPT

## 2024-09-09 ENCOUNTER — HOSPITAL ENCOUNTER (OUTPATIENT)
Dept: CARDIAC REHAB | Facility: CLINIC | Age: 69
Discharge: HOME | End: 2024-09-09
Attending: INTERNAL MEDICINE
Payer: COMMERCIAL

## 2024-09-09 PROCEDURE — 93798 PHYS/QHP OP CAR RHAB W/ECG: CPT

## 2024-09-10 ENCOUNTER — LAB (OUTPATIENT)
Dept: LAB | Facility: CLINIC | Age: 69
End: 2024-09-10
Payer: COMMERCIAL

## 2024-09-10 DIAGNOSIS — I21.11 ST ELEVATION MYOCARDIAL INFARCTION INVOLVING RIGHT CORONARY ARTERY (H): ICD-10-CM

## 2024-09-10 LAB
CHOLEST SERPL-MCNC: 89 MG/DL
HDLC SERPL-MCNC: 33 MG/DL
LDLC SERPL CALC-MCNC: 29 MG/DL
NONHDLC SERPL-MCNC: 56 MG/DL
TRIGL SERPL-MCNC: 133 MG/DL

## 2024-09-10 PROCEDURE — 36415 COLL VENOUS BLD VENIPUNCTURE: CPT

## 2024-09-10 PROCEDURE — 80061 LIPID PANEL: CPT

## 2024-09-11 ENCOUNTER — HOSPITAL ENCOUNTER (OUTPATIENT)
Dept: CARDIAC REHAB | Facility: CLINIC | Age: 69
Discharge: HOME | End: 2024-09-11
Attending: INTERNAL MEDICINE
Payer: COMMERCIAL

## 2024-09-11 PROCEDURE — 93798 PHYS/QHP OP CAR RHAB W/ECG: CPT

## 2024-09-13 ENCOUNTER — HOSPITAL ENCOUNTER (OUTPATIENT)
Dept: CARDIAC REHAB | Facility: CLINIC | Age: 69
Discharge: HOME | End: 2024-09-13
Attending: INTERNAL MEDICINE
Payer: COMMERCIAL

## 2024-09-13 PROCEDURE — 93798 PHYS/QHP OP CAR RHAB W/ECG: CPT

## 2024-09-16 ENCOUNTER — HOSPITAL ENCOUNTER (OUTPATIENT)
Dept: CARDIAC REHAB | Facility: CLINIC | Age: 69
Discharge: HOME | End: 2024-09-16
Attending: INTERNAL MEDICINE
Payer: COMMERCIAL

## 2024-09-16 PROCEDURE — 93798 PHYS/QHP OP CAR RHAB W/ECG: CPT

## 2024-09-18 ENCOUNTER — HOSPITAL ENCOUNTER (OUTPATIENT)
Dept: CARDIAC REHAB | Facility: CLINIC | Age: 69
Discharge: HOME | End: 2024-09-18
Attending: INTERNAL MEDICINE
Payer: COMMERCIAL

## 2024-09-18 PROCEDURE — 93798 PHYS/QHP OP CAR RHAB W/ECG: CPT

## 2024-09-20 ENCOUNTER — HOSPITAL ENCOUNTER (OUTPATIENT)
Dept: CARDIAC REHAB | Facility: CLINIC | Age: 69
Discharge: HOME | End: 2024-09-20
Attending: INTERNAL MEDICINE
Payer: COMMERCIAL

## 2024-09-20 PROCEDURE — 93798 PHYS/QHP OP CAR RHAB W/ECG: CPT

## 2024-09-23 ENCOUNTER — HOSPITAL ENCOUNTER (OUTPATIENT)
Dept: CARDIAC REHAB | Facility: CLINIC | Age: 69
Discharge: HOME | End: 2024-09-23
Attending: INTERNAL MEDICINE
Payer: COMMERCIAL

## 2024-09-23 PROCEDURE — 93798 PHYS/QHP OP CAR RHAB W/ECG: CPT

## 2024-09-25 ENCOUNTER — HOSPITAL ENCOUNTER (OUTPATIENT)
Dept: CARDIAC REHAB | Facility: CLINIC | Age: 69
Discharge: HOME | End: 2024-09-25
Attending: INTERNAL MEDICINE
Payer: COMMERCIAL

## 2024-09-25 PROCEDURE — 93798 PHYS/QHP OP CAR RHAB W/ECG: CPT

## 2024-09-27 ENCOUNTER — HOSPITAL ENCOUNTER (OUTPATIENT)
Dept: CARDIAC REHAB | Facility: CLINIC | Age: 69
Discharge: HOME | End: 2024-09-27
Attending: INTERNAL MEDICINE
Payer: COMMERCIAL

## 2024-09-27 PROCEDURE — 93798 PHYS/QHP OP CAR RHAB W/ECG: CPT

## 2024-09-30 ENCOUNTER — OFFICE VISIT (OUTPATIENT)
Dept: CARDIOLOGY | Facility: CLINIC | Age: 69
End: 2024-09-30
Payer: COMMERCIAL

## 2024-09-30 ENCOUNTER — HOSPITAL ENCOUNTER (OUTPATIENT)
Dept: CARDIAC REHAB | Facility: CLINIC | Age: 69
Discharge: HOME | End: 2024-09-30
Attending: INTERNAL MEDICINE
Payer: COMMERCIAL

## 2024-09-30 VITALS
BODY MASS INDEX: 32.58 KG/M2 | HEART RATE: 55 BPM | WEIGHT: 208 LBS | RESPIRATION RATE: 14 BRPM | SYSTOLIC BLOOD PRESSURE: 159 MMHG | DIASTOLIC BLOOD PRESSURE: 88 MMHG

## 2024-09-30 DIAGNOSIS — I10 PRIMARY HYPERTENSION: ICD-10-CM

## 2024-09-30 DIAGNOSIS — E78.5 HYPERLIPIDEMIA LDL GOAL <100: ICD-10-CM

## 2024-09-30 DIAGNOSIS — I21.11 ST ELEVATION MYOCARDIAL INFARCTION INVOLVING RIGHT CORONARY ARTERY (H): ICD-10-CM

## 2024-09-30 DIAGNOSIS — I25.10 CAD, MULTIPLE VESSEL: ICD-10-CM

## 2024-09-30 PROCEDURE — G2211 COMPLEX E/M VISIT ADD ON: HCPCS | Performed by: INTERNAL MEDICINE

## 2024-09-30 PROCEDURE — 99204 OFFICE O/P NEW MOD 45 MIN: CPT | Performed by: INTERNAL MEDICINE

## 2024-09-30 PROCEDURE — 93798 PHYS/QHP OP CAR RHAB W/ECG: CPT

## 2024-09-30 RX ORDER — IRBESARTAN 150 MG/1
150 TABLET ORAL DAILY
Qty: 90 TABLET | Refills: 3 | Status: SHIPPED | OUTPATIENT
Start: 2024-09-30

## 2024-09-30 RX ORDER — ATORVASTATIN CALCIUM 80 MG/1
80 TABLET, FILM COATED ORAL DAILY
Qty: 90 TABLET | Refills: 3 | Status: SHIPPED | OUTPATIENT
Start: 2024-09-30

## 2024-09-30 RX ORDER — CARVEDILOL 6.25 MG/1
6.25 TABLET ORAL 2 TIMES DAILY WITH MEALS
Qty: 180 TABLET | Refills: 3 | Status: SHIPPED | OUTPATIENT
Start: 2024-09-30

## 2024-09-30 NOTE — LETTER
9/30/2024    Ruiz Baeza MD  [address]  [facility] [address]    RE: Moo Orourke       Dear Colleague,     I had the pleasure of seeing oMo Orourke in the [facility].    HEART CARE ENCOUNTER NOTE      [facility]  [phone]      Assessment/Recommendations   Assessment:   1.  CAD status post inferior STEMI 8/13/2024 treated with primary PCI to RCA and subsequent staged multivessel PCI on 8/14/2024 involving LAD, diagonal, and left circumflex marginal branches.  Currently doing very well and progressing through cardiac rehab without difficulty.  No recurrence of anginal symptoms.  No symptoms of CHF.  History of decreased LV function on echo obtained during index admission (EF 50-55 but significant ?anteroapical hypokinesia).   2.  Hyperlipidemia with excellent reduction in LDL on current regimen.  3.  Hypertension, suboptimal control, will need follow-up.    Plan:   1.  Continue present meds.  2.  Monitor blood pressure at cardiac rehab and at home.  Notify us if pressure significantly above 150 systolic.  Will intensify regimen if necessary.  3.  Complete cardiac rehab and continue home exercise regimen.  4.  Follow-up 1 year      The longitudinal plan of care for the diagnosis(es)/condition(s) as documented were addressed during this visit. Due to the added complexity in care, I will continue to support Moo in the subsequent management and with ongoing continuity of care.       History of Present Illness/Subjective    HPI: Moo Orourke is a 69 year old male presented with acute chest pain felt to be due to inferior STEMI.  He was treated with primary PCI to right coronary by Dr. Arriaza.  The following day he had staged PCI to the LAD diagonal bifurcation, proximal LAD, and multiple left circumflex branches performed by me.  His postintervention course was unremarkable he has been discharged to home in has been completing outpatient cardiac  rehab without difficulty.  He said no recurrence of anginal chest pain symptoms.  And no undue shortness of breath.  He is tolerating his medications well.  He is planning on going back to work in the next week or 2.  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  It is stressful but nonphysical work.    Studies reviewed:  EC/15/2024: Personally reviewed sinus rhythm.  Small Q's inferiorly with persistent 1 mm ST elevation.  R waves in anterior leads have normalized compared to previous studies.  Echo: 2024:  Left ventricular function is decreased. The ejection fraction is 50-55%  (borderline).  There is severe anterior, septal, and apical wall hypokinesis.  Normal right ventricle size and systolic function.  No hemodynamically significant valvular abnormalities on 2D or color flow  imaging.    Recent Labs   Lab Test 09/10/24  0713 24  0702 23  0927   LDL 29 124* 154*            Physical Examination  Review of Systems   BP (!) 159/88 (BP Location: Right arm, Patient Position: Sitting, Cuff Size: Adult Regular)   Pulse 55   Resp 14   Wt 94.3 kg (208 lb)   BMI 32.58 kg/m    Body mass index is 32.58 kg/m .  Wt Readings from Last 3 Encounters:   24 94.3 kg (208 lb)   24 95.7 kg (211 lb)   24 96.3 kg (212 lb 6.4 oz)       General Appearance:   Pleasant middle-age male appears stated age. no acute distress, normal body habitus   ENT/Mouth: Oropharynx normal    EYES:  no scleral icterus, normal conjunctivae. No eyelid xanthelasma   Neck: No cervical lymphadenopathy  Thyroid not enlarged or nodular  No JVD   Respiratory:   lungs clear , no rales or wheezing, Normal chest wall expansion    Cardiovascular:   Regular rhythm, normal rate. Normal 1st and 2nd heart sounds.  No systolic murmurs, no rubs or gallops;   radial pulses are full and equal   Jugular venous pressure is nonelevated   Abdomen/GI:  No tenderness, no organomegaly, no pulsatile masses. NBS.    Extremities: Leg brace on right (post surgery for ruptured quadricep tendon) no peripheral edema   Skin: No rash or lesions   Heme/lymph/ Immunology No lymphadenopathy, petechiae   Neurologic: Alert and oriented. No focal motor weakness, gait appears normal.       Psychiatric: Pleasant, calm, appropriate affect.          No known hepatic, renal, pulmonary, or CNS disorders. The remainder of the complete ROS is negative except as noted above.         Medical History  Surgical History Family History Social History   Past Medical History:   Diagnosis Date     Chronic cough      Facial basal cell cancer      Hypertension      Phimosis of penis      Psoriasis      Past Surgical History:   Procedure Laterality Date     CIRCUMCISION N/A 3/24/2020    Procedure: CIRCUMCISION;  Surgeon: Scott Maki MD;  Location: [facility];  Service: Urology     COLONOSCOPY       CV CORONARY ANGIOGRAM N/A 8/13/2024    Procedure: Coronary Angiogram;  Surgeon: Prasanna Arriaza MD;  Location: [facility] CV     CV CORONARY ANGIOGRAM N/A 8/14/2024    Procedure: Coronary Angiogram;  Surgeon: Pasha Musa MD;  Location: [facility] CV     CV INTRAVASULAR ULTRASOUND N/A 8/13/2024    Procedure: Intravascular Ultrasound;  Surgeon: Prasanna Arriaza MD;  Location: [facility]     CV PCI STENT DRUG ELUTING N/A 8/13/2024    Procedure: Percutaneous Coronary Intervention Stent;  Surgeon: Prasanna Arriaza MD;  Location: [facility] CV     CV PCI STENT DRUG ELUTING N/A 8/14/2024    Procedure: Percutaneous Coronary Intervention Stent;  Surgeon: Pasha Musa MD;  Location: [facility] CV     MOHS MICROGRAPHIC PROCEDURE Left 03/11/2020     No family history on file.     Social History     Socioeconomic History     Marital status:      Spouse name: Not on file     Number of children: Not on file     Years of education: Not on file     Highest education level: Not on file   Occupational  History     Not on file   Tobacco Use     Smoking status: Never     Passive exposure: Never     Smokeless tobacco: Never   Substance and Sexual Activity     Alcohol use: Never     Drug use: Never     Sexual activity: Not on file   Other Topics Concern     Not on file   Social History Narrative     Not on file     Social Determinants of Health     Financial Resource Strain: Not on file   Food Insecurity: Not on file   Transportation Needs: Not on file   Physical Activity: Not on file   Stress: Not on file   Social Connections: Not on file   Interpersonal Safety: Not on file   Housing Stability: Not on file           Medications  Allergies   Current Outpatient Medications   Medication Sig Dispense Refill     aspirin 81 MG EC tablet Take 1 tablet (81 mg) by mouth daily Start tomorrow. 30 tablet 3     atorvastatin (LIPITOR) 80 MG tablet Take 1 tablet (80 mg) by mouth daily. 90 tablet 3     carvedilol (COREG) 6.25 MG tablet Take 1 tablet (6.25 mg) by mouth 2 times daily (with meals). 180 tablet 3     coenzyme Q10 125 mg cap Take 125 mg by mouth daily       fish oil-omega-3 fatty acids 300-1,000 mg capsule [FISH OIL-OMEGA-3 FATTY ACIDS 300-1,000 MG CAPSULE] Take 2 g by mouth daily.       irbesartan (AVAPRO) 150 MG tablet Take 1 tablet (150 mg) by mouth daily. 90 tablet 3     Magnesium Oxide 250 MG TABS Take 250 mg by mouth daily       Multiple Vitamins-Minerals (ICAPS AREDS 2 PO) Take 1 tablet by mouth daily       niacin 100 MG tablet Take 100 mg by mouth daily (with breakfast)       risankizumab-rzaa (SKYRIZI SUBQ) [RISANKIZUMAB-RZAA (SKYRIZI SUBQ)] Inject under the skin.       ticagrelor (BRILINTA) 90 MG tablet Take 1 tablet (90 mg) by mouth every 12 hours. 180 tablet 3     Turmeric 400 MG CAPS Take 400 mg by mouth daily       vitamin D3 (CHOLECALCIFEROL) 50 mcg (2000 units) tablet Take 2 tablets by mouth daily         Allergies   Allergen Reactions     Lisinopril Cough          Lab Results    Chemistry/lipid CBC  "Cardiac Enzymes/BNP/TSH/INR   Recent Labs   Lab Test 09/10/24  0713   CHOL 89   HDL 33*   LDL 29   TRIG 133     Recent Labs   Lab Test 09/10/24  0713 08/13/24  0702 12/29/23  0927   LDL 29 124* 154*     Recent Labs   Lab Test 08/21/24  1252      POTASSIUM 4.1   CHLORIDE 104   CO2 16*   GLC 99   BUN 20.8   CR 0.91   GFRESTIMATED >90   ROMINA 9.0     Recent Labs   Lab Test 08/21/24  1252 08/15/24  0402 08/14/24  0440   CR 0.91 0.87 0.89     Recent Labs   Lab Test 12/29/23  0927   A1C 6.1*          Recent Labs   Lab Test 08/14/24  0440   WBC 8.7   HGB 13.5   HCT 39.6*   MCV 88        Recent Labs   Lab Test 08/14/24  0440 08/13/24  0432 12/29/23  0927   HGB 13.5 15.9 15.6    No results for input(s): \"TROPONINI\" in the last 67648 hours.  No results for input(s): \"BNP\", \"NTBNPI\", \"NTBNP\" in the last 66571 hours.  No results for input(s): \"TSH\" in the last 80734 hours.  Recent Labs   Lab Test 08/13/24  0432   INR 1.11        Pasha Musa MD                                        Thank you for allowing me to participate in the care of your patient.      Sincerely,     Pasha Musa MD     [facility]  cc:   Pasha Musa MD  [address]  [place] [address]      "

## 2024-09-30 NOTE — PROGRESS NOTES
HEART CARE ENCOUNTER NOTE      [facility]  [phone]      Assessment/Recommendations   Assessment:   1.  CAD status post inferior STEMI 8/13/2024 treated with primary PCI to RCA and subsequent staged multivessel PCI on 8/14/2024 involving LAD, diagonal, and left circumflex marginal branches.  Currently doing very well and progressing through cardiac rehab without difficulty.  No recurrence of anginal symptoms.  No symptoms of CHF.  History of decreased LV function on echo obtained during index admission (EF 50-55 but significant ?anteroapical hypokinesia).   2.  Hyperlipidemia with excellent reduction in LDL on current regimen.  3.  Hypertension, suboptimal control, will need follow-up.    Plan:   1.  Continue present meds.  2.  Monitor blood pressure at cardiac rehab and at home.  Notify us if pressure significantly above 150 systolic.  Will intensify regimen if necessary.  3.  Complete cardiac rehab and continue home exercise regimen.  4.  Follow-up 1 year      The longitudinal plan of care for the diagnosis(es)/condition(s) as documented were addressed during this visit. Due to the added complexity in care, I will continue to support Moo in the subsequent management and with ongoing continuity of care.       History of Present Illness/Subjective    HPI: Moo Orourke is a 69 year old male presented with acute chest pain felt to be due to inferior STEMI.  He was treated with primary PCI to right coronary by Dr. Arriaza.  The following day he had staged PCI to the LAD diagonal bifurcation, proximal LAD, and multiple left circumflex branches performed by me.  His postintervention course was unremarkable he has been discharged to home in has been completing outpatient cardiac rehab without difficulty.  He said no recurrence of anginal chest pain symptoms.  And no undue shortness of breath.  He is tolerating his medications well.  He is planning on going back to work in the next week or 2.  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  It is stressful but nonphysical work.    Studies reviewed:  EC/15/2024: Personally reviewed sinus rhythm.  Small Q's inferiorly with persistent 1 mm ST elevation.  R waves in anterior leads have normalized compared to previous studies.  Echo: 2024:  Left ventricular function is decreased. The ejection fraction is 50-55%  (borderline).  There is severe anterior, septal, and apical wall hypokinesis.  Normal right ventricle size and systolic function.  No hemodynamically significant valvular abnormalities on 2D or color flow  imaging.    Recent Labs   Lab Test 09/10/24  0713 24  0702 23  0927   LDL 29 124* 154*            Physical Examination  Review of Systems   BP (!) 159/88 (BP Location: Right arm, Patient Position: Sitting, Cuff Size: Adult Regular)   Pulse 55   Resp 14   Wt 94.3 kg (208 lb)   BMI 32.58 kg/m    Body mass index is 32.58 kg/m .  Wt Readings from Last 3 Encounters:   24 94.3 kg (208 lb)   24 95.7 kg (211 lb)   24 96.3 kg (212 lb 6.4 oz)       General Appearance:   Pleasant middle-age male appears stated age. no acute distress, normal body habitus   ENT/Mouth: Oropharynx normal    EYES:  no scleral icterus, normal conjunctivae. No eyelid xanthelasma   Neck: No cervical lymphadenopathy  Thyroid not enlarged or nodular  No JVD   Respiratory:   lungs clear , no rales or wheezing, Normal chest wall expansion    Cardiovascular:   Regular rhythm, normal rate. Normal 1st and 2nd heart sounds.  No systolic murmurs, no rubs or gallops;   radial pulses are full and equal   Jugular venous pressure is nonelevated   Abdomen/GI:  No tenderness, no organomegaly, no pulsatile masses. NBS.   Extremities: Leg brace on right (post surgery for ruptured quadricep tendon) no peripheral edema   Skin: No rash or lesions   Heme/lymph/ Immunology No lymphadenopathy, petechiae   Neurologic: Alert and oriented. No  focal motor weakness, gait appears normal.       Psychiatric: Pleasant, calm, appropriate affect.          No known hepatic, renal, pulmonary, or CNS disorders. The remainder of the complete ROS is negative except as noted above.         Medical History  Surgical History Family History Social History   Past Medical History:   Diagnosis Date    Chronic cough     Facial basal cell cancer     Hypertension     Phimosis of penis     Psoriasis      Past Surgical History:   Procedure Laterality Date    CIRCUMCISION N/A 3/24/2020    Procedure: CIRCUMCISION;  Surgeon: Scott Maki MD;  Location: [facility] [place];  Service: Urology    COLONOSCOPY      CV CORONARY ANGIOGRAM N/A 8/13/2024    Procedure: Coronary Angiogram;  Surgeon: Prasanna Arriaza MD;  Location: [facility] CV    CV CORONARY ANGIOGRAM N/A 8/14/2024    Procedure: Coronary Angiogram;  Surgeon: Pasha Musa MD;  Location: [facility] CV    CV INTRAVASULAR ULTRASOUND N/A 8/13/2024    Procedure: Intravascular Ultrasound;  Surgeon: Prasanna Arriaza MD;  Location: [facility] CV    CV PCI STENT DRUG ELUTING N/A 8/13/2024    Procedure: Percutaneous Coronary Intervention Stent;  Surgeon: Prasanna Arriaza MD;  Location: [facility] CV    CV PCI STENT DRUG ELUTING N/A 8/14/2024    Procedure: Percutaneous Coronary Intervention Stent;  Surgeon: Pasha uMsa MD;  Location: [facility] CV    MOHS MICROGRAPHIC PROCEDURE Left 03/11/2020     No family history on file.     Social History     Socioeconomic History    Marital status:      Spouse name: Not on file    Number of children: Not on file    Years of education: Not on file    Highest education level: Not on file   Occupational History    Not on file   Tobacco Use    Smoking status: Never     Passive exposure: Never    Smokeless tobacco: Never   Substance and Sexual Activity    Alcohol use: Never    Drug use: Never    Sexual activity: Not on file   Other Topics  Concern    Not on file   Social History Narrative    Not on file     Social Determinants of Health     Financial Resource Strain: Not on file   Food Insecurity: Not on file   Transportation Needs: Not on file   Physical Activity: Not on file   Stress: Not on file   Social Connections: Not on file   Interpersonal Safety: Not on file   Housing Stability: Not on file           Medications  Allergies   Current Outpatient Medications   Medication Sig Dispense Refill    aspirin 81 MG EC tablet Take 1 tablet (81 mg) by mouth daily Start tomorrow. 30 tablet 3    atorvastatin (LIPITOR) 80 MG tablet Take 1 tablet (80 mg) by mouth daily. 90 tablet 3    carvedilol (COREG) 6.25 MG tablet Take 1 tablet (6.25 mg) by mouth 2 times daily (with meals). 180 tablet 3    coenzyme Q10 125 mg cap Take 125 mg by mouth daily      fish oil-omega-3 fatty acids 300-1,000 mg capsule [FISH OIL-OMEGA-3 FATTY ACIDS 300-1,000 MG CAPSULE] Take 2 g by mouth daily.      irbesartan (AVAPRO) 150 MG tablet Take 1 tablet (150 mg) by mouth daily. 90 tablet 3    Magnesium Oxide 250 MG TABS Take 250 mg by mouth daily      Multiple Vitamins-Minerals (ICAPS AREDS 2 PO) Take 1 tablet by mouth daily      niacin 100 MG tablet Take 100 mg by mouth daily (with breakfast)      risankizumab-rzaa (SKYRIZI SUBQ) [RISANKIZUMAB-RZAA (SKYRIZI SUBQ)] Inject under the skin.      ticagrelor (BRILINTA) 90 MG tablet Take 1 tablet (90 mg) by mouth every 12 hours. 180 tablet 3    Turmeric 400 MG CAPS Take 400 mg by mouth daily      vitamin D3 (CHOLECALCIFEROL) 50 mcg (2000 units) tablet Take 2 tablets by mouth daily         Allergies   Allergen Reactions    Lisinopril Cough          Lab Results    Chemistry/lipid CBC Cardiac Enzymes/BNP/TSH/INR   Recent Labs   Lab Test 09/10/24  0713   CHOL 89   HDL 33*   LDL 29   TRIG 133     Recent Labs   Lab Test 09/10/24  0713 08/13/24  0702 12/29/23  0927   LDL 29 124* 154*     Recent Labs   Lab Test 08/21/24  1252      POTASSIUM 4.1  "  CHLORIDE 104   CO2 16*   GLC 99   BUN 20.8   CR 0.91   GFRESTIMATED >90   ROMINA 9.0     Recent Labs   Lab Test 08/21/24  1252 08/15/24  0402 08/14/24  0440   CR 0.91 0.87 0.89     Recent Labs   Lab Test 12/29/23  0927   A1C 6.1*          Recent Labs   Lab Test 08/14/24 0440   WBC 8.7   HGB 13.5   HCT 39.6*   MCV 88        Recent Labs   Lab Test 08/14/24  0440 08/13/24  0432 12/29/23  0927   HGB 13.5 15.9 15.6    No results for input(s): \"TROPONINI\" in the last 97217 hours.  No results for input(s): \"BNP\", \"NTBNPI\", \"NTBNP\" in the last 39309 hours.  No results for input(s): \"TSH\" in the last 28721 hours.  Recent Labs   Lab Test 08/13/24 0432   INR 1.11        Pasha Musa MD                                    "

## 2024-09-30 NOTE — PATIENT INSTRUCTIONS
Continue present meds  Monitor SBP at home. Notify us if consistently above 150.  Follow up 1 year  Echo in November already scheduled.

## 2024-10-02 ENCOUNTER — HOSPITAL ENCOUNTER (OUTPATIENT)
Dept: CARDIAC REHAB | Facility: CLINIC | Age: 69
Discharge: HOME | End: 2024-10-02
Attending: INTERNAL MEDICINE
Payer: COMMERCIAL

## 2024-10-02 PROCEDURE — 93798 PHYS/QHP OP CAR RHAB W/ECG: CPT

## 2024-10-02 PROCEDURE — 93798 PHYS/QHP OP CAR RHAB W/ECG: CPT | Performed by: REHABILITATION PRACTITIONER

## 2024-10-04 ENCOUNTER — HOSPITAL ENCOUNTER (OUTPATIENT)
Dept: CARDIAC REHAB | Facility: CLINIC | Age: 69
Discharge: HOME | End: 2024-10-04
Attending: INTERNAL MEDICINE
Payer: COMMERCIAL

## 2024-10-04 PROCEDURE — 93798 PHYS/QHP OP CAR RHAB W/ECG: CPT

## 2024-10-16 ENCOUNTER — TELEPHONE (OUTPATIENT)
Dept: CARDIOLOGY | Facility: CLINIC | Age: 69
End: 2024-10-16
Payer: COMMERCIAL

## 2024-10-16 VITALS — DIASTOLIC BLOOD PRESSURE: 62 MMHG | SYSTOLIC BLOOD PRESSURE: 115 MMHG

## 2024-10-16 NOTE — TELEPHONE ENCOUNTER
Patient returned call and left voicemail message with update blood pressure reading.      Last Blood Pressure: 159/88  Last Heart Rate: 55  Date: 09/30/24  Location: [facility]    Today's Blood Pressure: 115/62  Location: Home BP    Patient reported blood pressure updated in Epic. Blood pressure falls within [place] Community Measures guidelines.  Patient will follow up as previously advised.

## 2024-11-22 ENCOUNTER — HOSPITAL ENCOUNTER (OUTPATIENT)
Dept: CARDIOLOGY | Facility: HOSPITAL | Age: 69
Discharge: HOME | End: 2024-11-22
Attending: STUDENT IN AN ORGANIZED HEALTH CARE EDUCATION/TRAINING PROGRAM | Admitting: STUDENT IN AN ORGANIZED HEALTH CARE EDUCATION/TRAINING PROGRAM
Payer: COMMERCIAL

## 2024-11-22 DIAGNOSIS — I21.11 ST ELEVATION MYOCARDIAL INFARCTION INVOLVING RIGHT CORONARY ARTERY (H): ICD-10-CM

## 2024-11-22 LAB — LVEF ECHO: NORMAL

## 2024-11-22 PROCEDURE — 255N000002 HC RX 255 OP 636: Performed by: STUDENT IN AN ORGANIZED HEALTH CARE EDUCATION/TRAINING PROGRAM

## 2024-11-22 PROCEDURE — 93306 TTE W/DOPPLER COMPLETE: CPT | Mod: 26 | Performed by: INTERNAL MEDICINE

## 2024-11-22 RX ADMIN — PERFLUTREN 2.5 ML: 6.52 INJECTION, SUSPENSION INTRAVENOUS at 14:48

## 2024-11-25 NOTE — RESULT ENCOUNTER NOTE
Please let patient know his echocardiogram showed stable ejection fraction 50 to 55% which is borderline.  Hypokinesis is now only moderate not severe.  As long as patient is still asymptomatic denying angina, shortness of breath, lower extremity edema and blood pressure is well-controlled would favor keeping him on his current medications.

## 2024-11-28 ENCOUNTER — HOSPITAL ENCOUNTER (EMERGENCY)
Facility: CLINIC | Age: 69
Discharge: HOME | End: 2024-11-28
Attending: STUDENT IN AN ORGANIZED HEALTH CARE EDUCATION/TRAINING PROGRAM | Admitting: STUDENT IN AN ORGANIZED HEALTH CARE EDUCATION/TRAINING PROGRAM
Payer: COMMERCIAL

## 2024-11-28 VITALS
RESPIRATION RATE: 18 BRPM | TEMPERATURE: 97.8 F | OXYGEN SATURATION: 98 % | DIASTOLIC BLOOD PRESSURE: 98 MMHG | SYSTOLIC BLOOD PRESSURE: 204 MMHG | HEART RATE: 89 BPM

## 2024-11-28 DIAGNOSIS — I10 HYPERTENSION, UNSPECIFIED TYPE: ICD-10-CM

## 2024-11-28 DIAGNOSIS — L03.211 CELLULITIS OF FACE: ICD-10-CM

## 2024-11-28 PROCEDURE — 99283 EMERGENCY DEPT VISIT LOW MDM: CPT | Performed by: STUDENT IN AN ORGANIZED HEALTH CARE EDUCATION/TRAINING PROGRAM

## 2024-11-28 RX ORDER — CEPHALEXIN 500 MG/1
500 CAPSULE ORAL 4 TIMES DAILY
Qty: 20 CAPSULE | Refills: 0 | Status: SHIPPED | OUTPATIENT
Start: 2024-11-28 | End: 2024-12-03

## 2024-11-28 ASSESSMENT — COLUMBIA-SUICIDE SEVERITY RATING SCALE - C-SSRS
6. HAVE YOU EVER DONE ANYTHING, STARTED TO DO ANYTHING, OR PREPARED TO DO ANYTHING TO END YOUR LIFE?: NO
1. IN THE PAST MONTH, HAVE YOU WISHED YOU WERE DEAD OR WISHED YOU COULD GO TO SLEEP AND NOT WAKE UP?: NO
2. HAVE YOU ACTUALLY HAD ANY THOUGHTS OF KILLING YOURSELF IN THE PAST MONTH?: NO

## 2024-11-28 NOTE — ED TRIAGE NOTES
Pt is on topical chemo for pre skin cancer and scratched himself in his sleep last night. The right side of his face has scabbed debris on it      Triage Assessment (Adult)       Row Name 11/28/24 0314          Triage Assessment    Airway WDL WDL        Respiratory WDL    Respiratory WDL WDL        Skin Circulation/Temperature WDL    Skin Circulation/Temperature WDL X  RIght cheek wound        Cardiac WDL    Cardiac WDL WDL        Peripheral/Neurovascular WDL    Peripheral Neurovascular WDL WDL        Cognitive/Neuro/Behavioral WDL    Cognitive/Neuro/Behavioral WDL WDL

## 2024-11-28 NOTE — ED PROVIDER NOTES
History     Chief Complaint   Patient presents with    Derm Problem     HPI  Moo Orourke is a 69 year old male who has coronary artery disease, psoriasis, hypertension who presents to the emergency department with concern for skin infection.  Patient is currently on 5-fluorouracil for treatment of precancer on his face.  He states that this is the third round of this that he has done and he has never had this issue before.  He states that 2 nights ago he scratched himself in his sleep.  Now the area where he scratched himself it is hard, painful and red.  There is been no drainage.  Rapidly spreading redness.  He denies fever.  He has no chills or bodyaches.  He has no other symptoms.  He is noted to be quite hypertensive, but is asymptomatic.  Denies vision changes, trouble breathing or chest pain or headache or weakness.    Allergies:  Allergies   Allergen Reactions    Lisinopril Cough       Problem List:    Patient Active Problem List    Diagnosis Date Noted    ST elevation MI (STEMI) (H) 08/13/2024     Priority: Medium    ST elevation myocardial infarction (STEMI), unspecified artery (H) 08/13/2024     Priority: Medium        Past Medical History:    Past Medical History:   Diagnosis Date    Chronic cough     Facial basal cell cancer     Hypertension     Phimosis of penis     Psoriasis        Past Surgical History:    Past Surgical History:   Procedure Laterality Date    CIRCUMCISION N/A 3/24/2020    Procedure: CIRCUMCISION;  Surgeon: Scott Maki MD;  Location: [facility];  Service: Urology    COLONOSCOPY      CV CORONARY ANGIOGRAM N/A 8/13/2024    Procedure: Coronary Angiogram;  Surgeon: Prasanna Arriaza MD;  Location: [facility] CV    CV CORONARY ANGIOGRAM N/A 8/14/2024    Procedure: Coronary Angiogram;  Surgeon: Pasha Musa MD;  Location: [facility] CV    CV INTRAVASULAR ULTRASOUND N/A 8/13/2024    Procedure: Intravascular Ultrasound;  Surgeon: Prasanna Arriaza MD;   Location: [facility] CV    CV PCI STENT DRUG ELUTING N/A 8/13/2024    Procedure: Percutaneous Coronary Intervention Stent;  Surgeon: Prasanna Arriaza MD;  Location: [facility] CV    CV PCI STENT DRUG ELUTING N/A 8/14/2024    Procedure: Percutaneous Coronary Intervention Stent;  Surgeon: Pasha Musa MD;  Location: [facility] CV    MOHS MICROGRAPHIC PROCEDURE Left 03/11/2020       Family History:    No family history on file.    Social History:  Marital Status:   [2]  Social History     Tobacco Use    Smoking status: Never     Passive exposure: Never    Smokeless tobacco: Never   Substance Use Topics    Alcohol use: Never    Drug use: Never        Medications:    aspirin 81 MG EC tablet  atorvastatin (LIPITOR) 80 MG tablet  carvedilol (COREG) 6.25 MG tablet  cephALEXin (KEFLEX) 500 MG capsule  coenzyme Q10 125 mg cap  fish oil-omega-3 fatty acids 300-1,000 mg capsule  irbesartan (AVAPRO) 150 MG tablet  Magnesium Oxide 250 MG TABS  Multiple Vitamins-Minerals (ICAPS AREDS 2 PO)  niacin 100 MG tablet  risankizumab-rzaa (SKYRIZI SUBQ)  ticagrelor (BRILINTA) 90 MG tablet  Turmeric 400 MG CAPS  vitamin D3 (CHOLECALCIFEROL) 50 mcg (2000 units) tablet          Review of Systems  See HPI  Physical Exam   BP: (!) 204/98  Pulse: 89  Temp: 97.8  F (36.6  C)  Resp: 18  SpO2: 98 %      Physical Exam  BP (!) 204/98   Pulse 89   Temp 97.8  F (36.6  C) (Oral)   Resp 18   SpO2 98%   General: alert, interactive, in no apparent distress  Head: atraumatic  Nose: no rhinorrhea or epistaxis  Ears: no external auditory canal discharge or bleeding.    Eyes: Sclera nonicteric. Conjunctiva noninjected. PERRL, EOMI  Mouth: no tonsillar erythema, edema, or exudate.  Moist mucous membranes  Neck: supple, moving spontaneously no midline cervical tenderness.  No lymphadenopathy lungs: No increased work of breathing.  Clear to auscultation bilaterally.  CV: RRR, peripheral pulses palpable and  symmetric  Extremities: Warm and well-perfused.  Skin: There is a small scab on the right cheek with an area of erythema and induration.  It is mildly tender.  There are other small scabs on the face with surrounding erythema, but no induration.  There is no drainage from the wound.  No abscess.  Neuro: CN II-XII grossly intact, normal gait, following all commands    ED Course        Procedures             Critical Care time:  none             No results found for this or any previous visit (from the past 24 hours).    Medications - No data to display    Assessments & Plan (with Medical Decision Making)     I have reviewed the nursing notes.    I have reviewed the findings, diagnosis, plan and need for follow up with the patient.          Medical Decision Making  Moo Orourke is a 69 year old male who has coronary artery disease, psoriasis, hypertension who presents to the emergency department with concern for skin infection.  Vital signs reviewed notable for hypertension otherwise afebrile and reassuring.  Patient is nontoxic and well-appearing on exam.  Unclear if this represents an inflammatory reaction or cellulitis.  There is no abscess on exam.  He has some other areas of erythema, but does not have the tenderness and induration of the concerned area.  I had a long discussion with him regarding treatment.  We discussed a wait-and-see approach versus a trial of antibiotics.  Patient wants to trial antibiotics and I do think this is reasonable.  He has no systemic symptoms and I think a trial of outpatient management is appropriate.  I will prescribe a course of Keflex.  I recommended he follow-up with his dermatologist when able.  He is also noted to be quite hypertensive.  He is asymptomatic in this regard and no workup or treatment is needed in the ER.  He is on 2 different antihypertensives and I encouraged him to follow-up with his regular doctor in this regard.  Return precautions were  discussed.        New Prescriptions    CEPHALEXIN (KEFLEX) 500 MG CAPSULE    Take 1 capsule (500 mg) by mouth 4 times daily for 5 days.       Final diagnoses:   Cellulitis of face   Hypertension, unspecified type       11/28/2024   [facility] EMERGENCY DEPT       Tyler Boykin MD  11/28/24 [id number]

## 2024-11-28 NOTE — DISCHARGE INSTRUCTIONS
The swelling and redness is either a local inflammatory reaction or a developing infection.  After our discussion, you decided to trial antibiotics.  I prescribed Keflex, that you should take 4 times per day for 5 days.  If it does not get better with the antibiotics it is probably because it was not an infection.  Call your dermatologist to discuss and follow-up with them when able.  If you develop fever, rapidly worsening redness or swelling you should return to the ER.  Also note that your blood pressure is elevated and you should follow-up with your regular doctor for this.

## 2024-12-06 ENCOUNTER — TELEPHONE (OUTPATIENT)
Dept: CARDIOLOGY | Facility: CLINIC | Age: 69
End: 2024-12-06
Payer: COMMERCIAL

## 2024-12-09 NOTE — TELEPHONE ENCOUNTER
----- Message -----  From: Pasha Musa MD  Sent: 12/9/2024   1:17 PM CST  To: Monica Higgins RN  Subject: Interrupt Brilinta                               Okay to hold Brilinta for 3 doses prior to dermatologic surgery.  Resume same day after surgery if no apparent bleeding complication.Pasha Musa MD on 12/9/2024 at 1:16 PM    ----- Message -----  From: Monica Higgins RN  Sent: 12/6/2024  12:33 PM CST  To: Pasha Musa MD    Please see pt request if able to hold Brilinta for 1 day prior to upcoming dermatology procedure 12/17/24. Pt is s/p staged multivessel PCI 8/14/24. Please review and provide orders if appropriate. Thanks! LMS    ==  Return phone call to pt with response from Dr. Musa. Pt verbalized understanding after questions answered. Pt will call back if has any additional questions otherwise no further action needed at this time. LMS

## 2024-12-11 ENCOUNTER — TELEPHONE (OUTPATIENT)
Dept: CARDIOLOGY | Facility: CLINIC | Age: 69
End: 2024-12-11
Payer: COMMERCIAL

## 2024-12-11 DIAGNOSIS — E78.5 DYSLIPIDEMIA: ICD-10-CM

## 2024-12-11 DIAGNOSIS — I10 PRIMARY HYPERTENSION: Primary | ICD-10-CM

## 2024-12-11 DIAGNOSIS — I25.10 CAD, MULTIPLE VESSEL: ICD-10-CM

## 2024-12-11 NOTE — TELEPHONE ENCOUNTER
Phone call to pt requesting BP update. Pt states he feels his BP is still labile. He says he sits 10-15 minutes prior to taking and tends to take 3 consecutive readings in the same timeframe.     Pt provided an example of readings:   1st - 147/70  2nd - 132/72  3rd - 129/60    Pt felt his BP tends to be better when checked manually. Stated at his recent MD appt, the manual BP check was 118/62. [place] he had a similar pattern when he participated in cardiac rehab. He states he still does 30 minutes of cardiac exercise daily but still notices the same pattern as above. Pt is concerned that his first reading is generally always SBP 140s before decreasing to 120s. Pt states rarely has SBP started at >150. Pt denies any associated symptoms.     Reassured pt that update would be sent to RO and once response received will return call. Pt verbalized understanding.     Pt also questioned in relation to upcoming dermatology procedure if he needs to hold his Aspirin as well. Informed pt that based on previous phone encounter, noted only asked about Brilinta but would be worthwhile confirming with dermatologist if Aspirin also needs to be held and if so, would need to obtain hold orders. Pt stated he will reach out to dermatologist clinic tomorrow once they are open and callback with update. MARGARITA

## 2024-12-21 NOTE — TELEPHONE ENCOUNTER
If blood pressures are well controlled at doctor appointments I think we can continue to monitor at this time. If they continue being 140 systolic even after sitting for 15 minutes can let us know and send numbers in another two weeks.     ----- Message -----  From: Monica Higgins RN  Sent: 12/11/2024   4:24 PM CST  To: Su Garcia PA-C    Please see pt BP update. Didn't provide any other days but states this is a general daily trend. 1st BP check when sitting is always SBP 140s and then as he continues to sit, by his 3rd check it's SBP 120s. This is concerning to the pt since he states he does sit 10-15 minutes before even taking the 1st reading. Any new recommendations? Thanks! LMS

## 2024-12-23 NOTE — TELEPHONE ENCOUNTER
Left detailed message for pt with Su's response/recommendations. Requested callback if pt has questions otherwise to callback with BP update in 2 weeks. MARGARITA

## 2024-12-31 ENCOUNTER — LAB REQUISITION (OUTPATIENT)
Dept: LAB | Facility: CLINIC | Age: 69
End: 2024-12-31
Payer: COMMERCIAL

## 2024-12-31 DIAGNOSIS — Z12.5 ENCOUNTER FOR SCREENING FOR MALIGNANT NEOPLASM OF PROSTATE: ICD-10-CM

## 2024-12-31 DIAGNOSIS — I25.10 ATHEROSCLEROTIC HEART DISEASE OF NATIVE CORONARY ARTERY WITHOUT ANGINA PECTORIS: ICD-10-CM

## 2024-12-31 DIAGNOSIS — I10 ESSENTIAL (PRIMARY) HYPERTENSION: ICD-10-CM

## 2025-01-02 LAB
ANION GAP SERPL CALCULATED.3IONS-SCNC: 10 MMOL/L (ref 7–15)
BUN SERPL-MCNC: 21.7 MG/DL (ref 8–23)
CALCIUM SERPL-MCNC: 9.4 MG/DL (ref 8.8–10.4)
CHLORIDE SERPL-SCNC: 103 MMOL/L (ref 98–107)
CHOLEST SERPL-MCNC: 102 MG/DL
CREAT SERPL-MCNC: 0.84 MG/DL (ref 0.67–1.17)
EGFRCR SERPLBLD CKD-EPI 2021: >90 ML/MIN/1.73M2
FASTING STATUS PATIENT QL REPORTED: YES
FASTING STATUS PATIENT QL REPORTED: YES
GLUCOSE SERPL-MCNC: 92 MG/DL (ref 70–99)
HCO3 SERPL-SCNC: 25 MMOL/L (ref 22–29)
HDLC SERPL-MCNC: 38 MG/DL
LDLC SERPL CALC-MCNC: 48 MG/DL
NONHDLC SERPL-MCNC: 64 MG/DL
POTASSIUM SERPL-SCNC: 4.2 MMOL/L (ref 3.4–5.3)
PSA SERPL DL<=0.01 NG/ML-MCNC: 2.27 NG/ML (ref 0–4.5)
SODIUM SERPL-SCNC: 138 MMOL/L (ref 135–145)
TRIGL SERPL-MCNC: 79 MG/DL

## 2025-01-02 PROCEDURE — 80048 BASIC METABOLIC PNL TOTAL CA: CPT | Mod: ORL | Performed by: FAMILY MEDICINE

## 2025-01-02 PROCEDURE — 80061 LIPID PANEL: CPT | Mod: ORL | Performed by: FAMILY MEDICINE

## 2025-01-02 PROCEDURE — G0103 PSA SCREENING: HCPCS | Mod: ORL | Performed by: FAMILY MEDICINE

## 2025-01-08 RX ORDER — HYDROCHLOROTHIAZIDE 12.5 MG/1
12.5 TABLET ORAL DAILY
Qty: 30 TABLET | Refills: 0 | Status: SHIPPED | OUTPATIENT
Start: 2025-01-08

## 2025-01-08 NOTE — TELEPHONE ENCOUNTER
I think it is reasonable to restart hydrochlorothiazide 12.5 mg daily and make sure patient stays hydrated drinking 50-60oz of fluids a day. If patient tolerates this we can switch to combination pill. Would like BMP in 1 week    ----- Message -----  From: Monica Higgins RN  Sent: 1/8/2025  11:52 AM CST  To: Su Garcia PA-C    Please see pt BP update and recent PCP visit 12/31. Continues to start with elevated numbers -150s but eventually comes down -135. Pt continues to be concerned with initial elevated reading. Any recommendations? Thanks! LMS

## 2025-01-08 NOTE — TELEPHONE ENCOUNTER
Phone call to pt requesting BP update. Pt stated that he didn't have current access to his BP readings as he was not home.     Stated in general, it still starts high, -150s, then after ~15 minutes will be lower -135. His most recent reading was 147/80, waited 15 minutes, then recheck was 127/62.     Pt noted he did see his PCP recently 12/31/24 and his initial BP check was 158/82 and upon recheck at the end of the appt was 136/82. Per pt, PCP would prefer better control of HTN, however pt is wishing Cardiology provide input and manage his medications.     Pt did also bring in his BP machine to have it check against clinic and stated it was functioning correctly.     Confirmed currently taking Irbesartan 150mg daily and Carvedilol 6.25 mg BID. Pt felt he had better control when he was previously taking Irbesartan-hydrochlorothiazide, but this was stopped during hospitalization 8/14/24 to avoid volume depletion.     Reassured pt that will forward along update to [facility] for further recommendations. Informed pt will return call once response received. Understanding verbalized. LMS

## 2025-01-08 NOTE — TELEPHONE ENCOUNTER
Phone call to pt with updated recommendations from RO. Pt verbalized understanding and is agreeable to plan. 30 day supply sent to pt's preferred pharmacy. Pt stated he will complete his BMP at a walk-in lab 1/16 or 1/17.     Instructed pt to continue to monitor BP and ensure he is adequately hydrating. Pt verbalized understanding. Reassured pt that once BMP resulted, will reach out with Su's updated comments/recommendations. Understanding verbalized of plan. MARGARITA

## 2025-01-17 ENCOUNTER — LAB (OUTPATIENT)
Dept: LAB | Facility: HOSPITAL | Age: 70
End: 2025-01-17
Payer: COMMERCIAL

## 2025-01-17 DIAGNOSIS — I10 PRIMARY HYPERTENSION: ICD-10-CM

## 2025-01-17 DIAGNOSIS — E78.5 DYSLIPIDEMIA: ICD-10-CM

## 2025-01-17 LAB
ANION GAP SERPL CALCULATED.3IONS-SCNC: 9 MMOL/L (ref 7–15)
BUN SERPL-MCNC: 26.9 MG/DL (ref 8–23)
CALCIUM SERPL-MCNC: 9.1 MG/DL (ref 8.8–10.4)
CHLORIDE SERPL-SCNC: 102 MMOL/L (ref 98–107)
CREAT SERPL-MCNC: 1.1 MG/DL (ref 0.67–1.17)
EGFRCR SERPLBLD CKD-EPI 2021: 74 ML/MIN/1.73M2
GLUCOSE SERPL-MCNC: 131 MG/DL (ref 70–99)
HCO3 SERPL-SCNC: 27 MMOL/L (ref 22–29)
POTASSIUM SERPL-SCNC: 4 MMOL/L (ref 3.4–5.3)
SODIUM SERPL-SCNC: 138 MMOL/L (ref 135–145)

## 2025-01-17 PROCEDURE — 36415 COLL VENOUS BLD VENIPUNCTURE: CPT

## 2025-01-17 PROCEDURE — 80048 BASIC METABOLIC PNL TOTAL CA: CPT

## 2025-01-17 PROCEDURE — 82374 ASSAY BLOOD CARBON DIOXIDE: CPT

## 2025-01-20 DIAGNOSIS — I25.10 CAD, MULTIPLE VESSEL: ICD-10-CM

## 2025-01-20 DIAGNOSIS — I10 PRIMARY HYPERTENSION: ICD-10-CM

## 2025-01-20 DIAGNOSIS — E78.5 DYSLIPIDEMIA: ICD-10-CM

## 2025-01-20 RX ORDER — HYDROCHLOROTHIAZIDE 12.5 MG/1
12.5 TABLET ORAL DAILY
Qty: 90 TABLET | Refills: 0 | Status: SHIPPED | OUTPATIENT
Start: 2025-01-20

## 2025-01-20 NOTE — RESULT ENCOUNTER NOTE
Please let patient know his lab work looks good other than his glucose being elevated. It was WNL 2 weeks ago so likely not a concern but he has had elevated hgb A1c in the past so would like him to discuss with PCP but would likely be due for another hgb A1c check. Can inquire how his BP has been and would encourage him to push more fluids.

## 2025-03-06 DIAGNOSIS — E78.5 DYSLIPIDEMIA: ICD-10-CM

## 2025-03-06 DIAGNOSIS — I25.10 CAD, MULTIPLE VESSEL: ICD-10-CM

## 2025-03-06 DIAGNOSIS — I10 PRIMARY HYPERTENSION: ICD-10-CM

## 2025-03-06 RX ORDER — HYDROCHLOROTHIAZIDE 12.5 MG/1
12.5 TABLET ORAL DAILY
Qty: 90 TABLET | Refills: 1 | Status: SHIPPED | OUTPATIENT
Start: 2025-03-06

## 2025-06-09 DIAGNOSIS — I10 PRIMARY HYPERTENSION: ICD-10-CM

## 2025-06-09 DIAGNOSIS — I25.10 CAD, MULTIPLE VESSEL: ICD-10-CM

## 2025-06-09 DIAGNOSIS — E78.5 DYSLIPIDEMIA: ICD-10-CM

## 2025-06-09 RX ORDER — HYDROCHLOROTHIAZIDE 12.5 MG/1
12.5 TABLET ORAL DAILY
Qty: 90 TABLET | Refills: 3 | Status: SHIPPED | OUTPATIENT
Start: 2025-06-09

## 2025-07-31 DIAGNOSIS — I21.11 ST ELEVATION MYOCARDIAL INFARCTION INVOLVING RIGHT CORONARY ARTERY (H): ICD-10-CM

## 2025-07-31 DIAGNOSIS — I10 PRIMARY HYPERTENSION: ICD-10-CM

## 2025-07-31 RX ORDER — IRBESARTAN 150 MG/1
150 TABLET ORAL DAILY
Qty: 90 TABLET | Refills: 0 | Status: SHIPPED | OUTPATIENT
Start: 2025-07-31

## 2025-08-20 DIAGNOSIS — E78.5 HYPERLIPIDEMIA LDL GOAL <100: ICD-10-CM

## 2025-08-20 DIAGNOSIS — I10 PRIMARY HYPERTENSION: ICD-10-CM

## 2025-08-20 DIAGNOSIS — I21.11 ST ELEVATION MYOCARDIAL INFARCTION INVOLVING RIGHT CORONARY ARTERY (H): ICD-10-CM

## 2025-08-20 DIAGNOSIS — I25.10 CAD, MULTIPLE VESSEL: ICD-10-CM

## 2025-08-20 RX ORDER — TICAGRELOR 90 MG/1
90 TABLET, FILM COATED ORAL EVERY 12 HOURS
Qty: 180 TABLET | Refills: 0 | Status: SHIPPED | OUTPATIENT
Start: 2025-08-20

## 2025-08-20 RX ORDER — ATORVASTATIN CALCIUM 80 MG/1
80 TABLET, FILM COATED ORAL DAILY
Qty: 90 TABLET | Refills: 0 | Status: SHIPPED | OUTPATIENT
Start: 2025-08-20

## 2025-08-20 RX ORDER — CARVEDILOL 6.25 MG/1
6.25 TABLET ORAL 2 TIMES DAILY WITH MEALS
Qty: 180 TABLET | Refills: 0 | Status: SHIPPED | OUTPATIENT
Start: 2025-08-20

## (undated) DEVICE — CATH BALLOON NC EMERGE 3.50X12MM H7493926712350

## (undated) DEVICE — CATH DIAGNOSTIC RADIAL 5FR TIG 4.0

## (undated) DEVICE — CATH RX TAKERU PTCA BALLOON 1.5X6MM DC-RY1506UA1

## (undated) DEVICE — EXCHANGE WIRE .035 260 STAR/JFC/035/260/ M001491681

## (undated) DEVICE — CATH GUIDELINER 6FR 5571

## (undated) DEVICE — CATH IVUS OPTICROSS HD 6 3.6FR 1.18MM DIA 135CML H7493935408

## (undated) DEVICE — CATH ANGIO JUDKINS JL4 6FRX100CM INFINITI 534620T

## (undated) DEVICE — VALVE HEMOSTASIS .096" COPILOT MECH 1003331

## (undated) DEVICE — SYR ANGIOGRAPHY MULTIUSE KIT ACIST 014612

## (undated) DEVICE — CATH BALLOON NC EMERGE 3.00X15MM H7493926715300

## (undated) DEVICE — GUIDEWIRE VASC 0.014INX180CM RUNTHROUGH 25-1011

## (undated) DEVICE — INTRO GLIDESHEATH SLENDER 6FR 10X45CM 60-1060

## (undated) DEVICE — CATH BALLOON EMERGE 2.0X20MM H7493918920200

## (undated) DEVICE — CATH LAUNCHER 6FR EBU 4.0 LA6EBU40

## (undated) DEVICE — CATH ANGIO INFINITI JR4 4FRX100CM 538421

## (undated) DEVICE — CATH LAUNCHER 6FR JR 4.0 LA6JR40

## (undated) DEVICE — CUSTOM PACK CORONARY SAN5BCRHEA

## (undated) DEVICE — CATH BALLOON NC EMERGE 2.50X12MM H7493926712250

## (undated) DEVICE — KIT HAND CONTROL ACIST 014644 AR-P54

## (undated) DEVICE — GUIDEWIRE FORTE FLOPPY J TOP 34949-05J

## (undated) DEVICE — DEVICE INFLATION SYR W/ HEMOSTASIS VALVE 12IN EXT IN4904

## (undated) DEVICE — SHTH INTRO 0.021IN ID 6FR DIA

## (undated) DEVICE — CATH BALLOON EMERGE 2.5X12MM H7493918912250

## (undated) DEVICE — CATH LAUNCHER 6FR LA6EBU375

## (undated) DEVICE — CATH BALLOON NC EMERGE 4.00X12MM H7493926712400

## (undated) DEVICE — MANIFOLD KIT ANGIO AUTOMATED 014613

## (undated) DEVICE — ELECTRODE DEFIB CADENCE 22550R

## (undated) DEVICE — SLEEVE TR BAND RADIAL COMPRESSION DEVICE 24CM TRB24-REG

## (undated) DEVICE — WIRE GUIDE HI-TRQ  WHISPER MS JTIP 0.014"X190CM 1005357HJ

## (undated) RX ORDER — FENTANYL CITRATE 50 UG/ML
INJECTION, SOLUTION INTRAMUSCULAR; INTRAVENOUS
Status: DISPENSED
Start: 2024-08-14

## (undated) RX ORDER — FENTANYL CITRATE 50 UG/ML
INJECTION, SOLUTION INTRAMUSCULAR; INTRAVENOUS
Status: DISPENSED
Start: 2024-08-13